# Patient Record
Sex: FEMALE | Race: WHITE | Employment: OTHER | URBAN - NONMETROPOLITAN AREA
[De-identification: names, ages, dates, MRNs, and addresses within clinical notes are randomized per-mention and may not be internally consistent; named-entity substitution may affect disease eponyms.]

---

## 2017-04-07 ENCOUNTER — HOSPITAL ENCOUNTER (OUTPATIENT)
Age: 77
Discharge: HOME | End: 2017-04-07
Payer: MEDICARE

## 2017-04-07 DIAGNOSIS — E03.9 HYPOTHYROIDISM, UNSPECIFIED TYPE: ICD-10-CM

## 2017-04-07 DIAGNOSIS — E78.5 HYPERLIPIDEMIA, UNSPECIFIED HYPERLIPIDEMIA TYPE: ICD-10-CM

## 2017-04-07 DIAGNOSIS — I10 ESSENTIAL HYPERTENSION: ICD-10-CM

## 2017-04-07 LAB
ABSOLUTE EOS #: 0.28 K/UL (ref 0–0.4)
ABSOLUTE LYMPH #: 2.1 K/UL (ref 1–4.8)
ABSOLUTE MONO #: 0.7 K/UL (ref 0–1)
ALBUMIN SERPL-MCNC: 4.2 G/DL (ref 3.5–5.2)
ALBUMIN/GLOBULIN RATIO: 1.4 (ref 1–2.5)
ALP BLD-CCNC: 67 U/L (ref 35–104)
ALT SERPL-CCNC: 13 U/L (ref 5–33)
ANION GAP SERPL CALCULATED.3IONS-SCNC: 11 MMOL/L (ref 9–17)
AST SERPL-CCNC: 20 U/L
BASOPHILS # BLD: 1 % (ref 0–2)
BASOPHILS ABSOLUTE: 0.07 K/UL (ref 0–0.2)
BILIRUB SERPL-MCNC: 0.49 MG/DL (ref 0.3–1.2)
BUN BLDV-MCNC: 23 MG/DL (ref 8–23)
BUN/CREAT BLD: 31 (ref 9–20)
CALCIUM SERPL-MCNC: 9.4 MG/DL (ref 8.6–10.4)
CHLORIDE BLD-SCNC: 100 MMOL/L (ref 98–107)
CHOLESTEROL/HDL RATIO: 1.8
CHOLESTEROL: 180 MG/DL
CO2: 31 MMOL/L (ref 20–31)
CREAT SERPL-MCNC: 0.75 MG/DL (ref 0.5–0.9)
DIFFERENTIAL TYPE: ABNORMAL
EOSINOPHILS RELATIVE PERCENT: 4 % (ref 0–8)
GFR AFRICAN AMERICAN: >60 ML/MIN
GFR NON-AFRICAN AMERICAN: >60 ML/MIN
GFR SERPL CREATININE-BSD FRML MDRD: ABNORMAL ML/MIN/{1.73_M2}
GFR SERPL CREATININE-BSD FRML MDRD: ABNORMAL ML/MIN/{1.73_M2}
GLUCOSE BLD-MCNC: 101 MG/DL (ref 70–99)
HCT VFR BLD CALC: 38.7 % (ref 36–46)
HDLC SERPL-MCNC: 100 MG/DL
HEMOGLOBIN: 12.4 G/DL (ref 12–16)
LDL CHOLESTEROL: 69 MG/DL (ref 0–130)
LYMPHOCYTES # BLD: 30 % (ref 24–44)
MCH RBC QN AUTO: 31.7 PG (ref 26–34)
MCHC RBC AUTO-ENTMCNC: 32.2 G/DL (ref 31–37)
MCV RBC AUTO: 98.3 FL (ref 80–100)
MONOCYTES # BLD: 10 % (ref 0–12)
MORPHOLOGY: ABNORMAL
PDW BLD-RTO: 14 % (ref 12.1–15.2)
PLATELET # BLD: 504 K/UL (ref 140–450)
PLATELET ESTIMATE: ABNORMAL
PMV BLD AUTO: ABNORMAL FL (ref 6–12)
POTASSIUM SERPL-SCNC: 4.3 MMOL/L (ref 3.7–5.3)
RBC # BLD: 3.93 M/UL (ref 4–5.2)
RBC # BLD: ABNORMAL 10*6/UL
SEG NEUTROPHILS: 55 % (ref 36–66)
SEGMENTED NEUTROPHILS ABSOLUTE COUNT: 3.85 K/UL (ref 1.8–7.7)
SODIUM BLD-SCNC: 142 MMOL/L (ref 135–144)
TOTAL PROTEIN: 7.2 G/DL (ref 6.4–8.3)
TRIGL SERPL-MCNC: 54 MG/DL
TSH SERPL DL<=0.05 MIU/L-ACNC: 2.46 MIU/L (ref 0.3–5)
VLDLC SERPL CALC-MCNC: NORMAL MG/DL (ref 1–30)
WBC # BLD: 7 K/UL (ref 3.5–11)
WBC # BLD: ABNORMAL 10*3/UL

## 2017-04-07 PROCEDURE — 80053 COMPREHEN METABOLIC PANEL: CPT

## 2017-04-07 PROCEDURE — 36415 COLL VENOUS BLD VENIPUNCTURE: CPT

## 2017-04-07 PROCEDURE — 80061 LIPID PANEL: CPT

## 2017-04-07 PROCEDURE — 85025 COMPLETE CBC W/AUTO DIFF WBC: CPT

## 2017-04-07 PROCEDURE — 84443 ASSAY THYROID STIM HORMONE: CPT

## 2017-08-14 ENCOUNTER — HOSPITAL ENCOUNTER (OUTPATIENT)
Dept: VASCULAR LAB | Age: 77
Discharge: HOME | End: 2017-08-14
Payer: MEDICARE

## 2017-08-14 DIAGNOSIS — R09.89 BRUIT OF LEFT CAROTID ARTERY: ICD-10-CM

## 2017-08-14 PROCEDURE — 93880 EXTRACRANIAL BILAT STUDY: CPT

## 2017-08-29 ENCOUNTER — HOSPITAL ENCOUNTER (OUTPATIENT)
Dept: LAB | Age: 77
Discharge: HOME | End: 2017-08-29
Payer: MEDICARE

## 2017-08-29 ENCOUNTER — HOSPITAL ENCOUNTER (OUTPATIENT)
Dept: NON INVASIVE DIAGNOSTICS | Age: 77
Discharge: HOME | End: 2017-08-29
Payer: MEDICARE

## 2017-08-29 LAB
ANION GAP SERPL CALCULATED.3IONS-SCNC: 13 MMOL/L (ref 9–17)
BUN BLDV-MCNC: 31 MG/DL (ref 8–23)
BUN/CREAT BLD: 34 (ref 9–20)
CALCIUM SERPL-MCNC: 9.8 MG/DL (ref 8.6–10.4)
CHLORIDE BLD-SCNC: 99 MMOL/L (ref 98–107)
CO2: 29 MMOL/L (ref 20–31)
CREAT SERPL-MCNC: 0.9 MG/DL (ref 0.5–0.9)
GFR AFRICAN AMERICAN: >60 ML/MIN
GFR NON-AFRICAN AMERICAN: >60 ML/MIN
GFR SERPL CREATININE-BSD FRML MDRD: ABNORMAL ML/MIN/{1.73_M2}
GFR SERPL CREATININE-BSD FRML MDRD: ABNORMAL ML/MIN/{1.73_M2}
GLUCOSE BLD-MCNC: 95 MG/DL (ref 70–99)
HCT VFR BLD CALC: 34.2 % (ref 36–46)
HEMOGLOBIN: 11.1 G/DL (ref 12–16)
LV EF: 60 %
LVEF MODALITY: NORMAL
MCH RBC QN AUTO: 31.5 PG (ref 26–34)
MCHC RBC AUTO-ENTMCNC: 32.5 G/DL (ref 31–37)
MCV RBC AUTO: 97.2 FL (ref 80–100)
PDW BLD-RTO: 14.9 % (ref 12.1–15.2)
PLATELET # BLD: 601 K/UL (ref 140–450)
PMV BLD AUTO: 9 FL (ref 6–12)
POTASSIUM SERPL-SCNC: 4.6 MMOL/L (ref 3.7–5.3)
RBC # BLD: 3.52 M/UL (ref 4–5.2)
SODIUM BLD-SCNC: 141 MMOL/L (ref 135–144)
WBC # BLD: 8.4 K/UL (ref 3.5–11)

## 2017-08-29 PROCEDURE — 93005 ELECTROCARDIOGRAM TRACING: CPT

## 2017-08-29 PROCEDURE — 80048 BASIC METABOLIC PNL TOTAL CA: CPT

## 2017-08-29 PROCEDURE — 36415 COLL VENOUS BLD VENIPUNCTURE: CPT

## 2017-08-29 PROCEDURE — 93306 TTE W/DOPPLER COMPLETE: CPT

## 2017-08-29 PROCEDURE — 85027 COMPLETE CBC AUTOMATED: CPT

## 2017-08-31 LAB
EKG ATRIAL RATE: 89 BPM
EKG P AXIS: 68 DEGREES
EKG P-R INTERVAL: 234 MS
EKG Q-T INTERVAL: 378 MS
EKG QRS DURATION: 88 MS
EKG QTC CALCULATION (BAZETT): 459 MS
EKG R AXIS: 31 DEGREES
EKG T AXIS: 70 DEGREES
EKG VENTRICULAR RATE: 89 BPM

## 2017-09-08 PROBLEM — I34.0 NON-RHEUMATIC MITRAL REGURGITATION: Status: ACTIVE | Noted: 2017-09-08

## 2017-09-08 PROBLEM — I27.20 PULMONARY HYPERTENSION (HCC): Status: ACTIVE | Noted: 2017-09-08

## 2017-09-08 PROBLEM — I65.22 STENOSIS OF LEFT CAROTID ARTERY: Status: ACTIVE | Noted: 2017-09-08

## 2017-09-08 PROBLEM — I36.1 NON-RHEUMATIC TRICUSPID VALVE INSUFFICIENCY: Status: ACTIVE | Noted: 2017-09-08

## 2017-09-22 ENCOUNTER — ANESTHESIA EVENT (OUTPATIENT)
Dept: OPERATING ROOM | Age: 77
DRG: 039 | End: 2017-09-22
Payer: MEDICARE

## 2017-09-22 RX ORDER — MULTIVIT-MIN/IRON/FOLIC ACID/K 18-600-40
2000 CAPSULE ORAL DAILY
COMMUNITY

## 2017-09-25 ENCOUNTER — ANESTHESIA (OUTPATIENT)
Dept: OPERATING ROOM | Age: 77
DRG: 039 | End: 2017-09-25
Payer: MEDICARE

## 2017-09-25 ENCOUNTER — HOSPITAL ENCOUNTER (INPATIENT)
Age: 77
LOS: 1 days | Discharge: HOME HEALTH | DRG: 039 | End: 2017-09-26
Attending: SURGERY | Admitting: SURGERY
Payer: MEDICARE

## 2017-09-25 VITALS — OXYGEN SATURATION: 99 % | DIASTOLIC BLOOD PRESSURE: 45 MMHG | TEMPERATURE: 96.8 F | SYSTOLIC BLOOD PRESSURE: 127 MMHG

## 2017-09-25 LAB — GLUCOSE BLD-MCNC: 98 MG/DL (ref 65–105)

## 2017-09-25 PROCEDURE — 3600000002 HC SURGERY LEVEL 2 BASE: Performed by: SURGERY

## 2017-09-25 PROCEDURE — 2580000003 HC RX 258: Performed by: SURGERY

## 2017-09-25 PROCEDURE — 88304 TISSUE EXAM BY PATHOLOGIST: CPT

## 2017-09-25 PROCEDURE — 6360000002 HC RX W HCPCS: Performed by: SURGERY

## 2017-09-25 PROCEDURE — 82947 ASSAY GLUCOSE BLOOD QUANT: CPT

## 2017-09-25 PROCEDURE — 2000000000 HC ICU R&B

## 2017-09-25 PROCEDURE — 6360000002 HC RX W HCPCS: Performed by: SPECIALIST

## 2017-09-25 PROCEDURE — 6370000000 HC RX 637 (ALT 250 FOR IP): Performed by: STUDENT IN AN ORGANIZED HEALTH CARE EDUCATION/TRAINING PROGRAM

## 2017-09-25 PROCEDURE — 2580000003 HC RX 258: Performed by: STUDENT IN AN ORGANIZED HEALTH CARE EDUCATION/TRAINING PROGRAM

## 2017-09-25 PROCEDURE — 2500000003 HC RX 250 WO HCPCS: Performed by: SURGERY

## 2017-09-25 PROCEDURE — 3700000001 HC ADD 15 MINUTES (ANESTHESIA): Performed by: SURGERY

## 2017-09-25 PROCEDURE — 2720000010 HC SURG SUPPLY STERILE: Performed by: SURGERY

## 2017-09-25 PROCEDURE — 2500000003 HC RX 250 WO HCPCS: Performed by: ANESTHESIOLOGY

## 2017-09-25 PROCEDURE — C1768 GRAFT, VASCULAR: HCPCS | Performed by: SURGERY

## 2017-09-25 PROCEDURE — 6360000002 HC RX W HCPCS: Performed by: STUDENT IN AN ORGANIZED HEALTH CARE EDUCATION/TRAINING PROGRAM

## 2017-09-25 PROCEDURE — 2500000003 HC RX 250 WO HCPCS: Performed by: SPECIALIST

## 2017-09-25 PROCEDURE — 2580000003 HC RX 258: Performed by: ANESTHESIOLOGY

## 2017-09-25 PROCEDURE — 2580000003 HC RX 258: Performed by: SPECIALIST

## 2017-09-25 PROCEDURE — 03CL0ZZ EXTIRPATION OF MATTER FROM LEFT INTERNAL CAROTID ARTERY, OPEN APPROACH: ICD-10-PCS | Performed by: SURGERY

## 2017-09-25 PROCEDURE — 3700000000 HC ANESTHESIA ATTENDED CARE: Performed by: SURGERY

## 2017-09-25 PROCEDURE — 3600000012 HC SURGERY LEVEL 2 ADDTL 15MIN: Performed by: SURGERY

## 2017-09-25 DEVICE — XENOSURE BIOLOGIC PATCH, 0.8CM X 8CM, EIFU
Type: IMPLANTABLE DEVICE | Site: CAROTID | Status: FUNCTIONAL
Brand: XENOSURE BIOLOGIC PATCH

## 2017-09-25 RX ORDER — ASCORBIC ACID 500 MG
500 TABLET ORAL DAILY
Status: DISCONTINUED | OUTPATIENT
Start: 2017-09-25 | End: 2017-09-26 | Stop reason: HOSPADM

## 2017-09-25 RX ORDER — MORPHINE SULFATE 2 MG/ML
2 INJECTION, SOLUTION INTRAMUSCULAR; INTRAVENOUS
Status: DISCONTINUED | OUTPATIENT
Start: 2017-09-25 | End: 2017-09-26

## 2017-09-25 RX ORDER — HEPARIN SODIUM 1000 [USP'U]/ML
INJECTION, SOLUTION INTRAVENOUS; SUBCUTANEOUS PRN
Status: DISCONTINUED | OUTPATIENT
Start: 2017-09-25 | End: 2017-09-25 | Stop reason: SDUPTHER

## 2017-09-25 RX ORDER — POTASSIUM CHLORIDE 20MEQ/15ML
40 LIQUID (ML) ORAL PRN
Status: DISCONTINUED | OUTPATIENT
Start: 2017-09-25 | End: 2017-09-26 | Stop reason: HOSPADM

## 2017-09-25 RX ORDER — HYDRALAZINE HYDROCHLORIDE 20 MG/ML
5 INJECTION INTRAMUSCULAR; INTRAVENOUS EVERY 10 MIN PRN
Status: DISCONTINUED | OUTPATIENT
Start: 2017-09-25 | End: 2017-09-26 | Stop reason: HOSPADM

## 2017-09-25 RX ORDER — ASPIRIN 81 MG/1
81 TABLET, CHEWABLE ORAL DAILY
Status: DISCONTINUED | OUTPATIENT
Start: 2017-09-26 | End: 2017-09-26 | Stop reason: HOSPADM

## 2017-09-25 RX ORDER — SIMVASTATIN 40 MG
40 TABLET ORAL NIGHTLY
Status: DISCONTINUED | OUTPATIENT
Start: 2017-09-25 | End: 2017-09-26 | Stop reason: HOSPADM

## 2017-09-25 RX ORDER — SODIUM CHLORIDE 9 MG/ML
INJECTION, SOLUTION INTRAVENOUS CONTINUOUS PRN
Status: DISCONTINUED | OUTPATIENT
Start: 2017-09-25 | End: 2017-09-25 | Stop reason: HOSPADM

## 2017-09-25 RX ORDER — LIDOCAINE HYDROCHLORIDE 10 MG/ML
1 INJECTION, SOLUTION EPIDURAL; INFILTRATION; INTRACAUDAL; PERINEURAL
Status: DISCONTINUED | OUTPATIENT
Start: 2017-09-25 | End: 2017-09-25 | Stop reason: HOSPADM

## 2017-09-25 RX ORDER — SODIUM CHLORIDE 0.9 % (FLUSH) 0.9 %
10 SYRINGE (ML) INJECTION EVERY 12 HOURS SCHEDULED
Status: DISCONTINUED | OUTPATIENT
Start: 2017-09-25 | End: 2017-09-26 | Stop reason: HOSPADM

## 2017-09-25 RX ORDER — GLYCOPYRROLATE 0.2 MG/ML
INJECTION INTRAMUSCULAR; INTRAVENOUS PRN
Status: DISCONTINUED | OUTPATIENT
Start: 2017-09-25 | End: 2017-09-25 | Stop reason: SDUPTHER

## 2017-09-25 RX ORDER — ONDANSETRON 2 MG/ML
4 INJECTION INTRAMUSCULAR; INTRAVENOUS EVERY 6 HOURS PRN
Status: DISCONTINUED | OUTPATIENT
Start: 2017-09-25 | End: 2017-09-26 | Stop reason: HOSPADM

## 2017-09-25 RX ORDER — M-VIT,TX,IRON,MINS/CALC/FOLIC 27MG-0.4MG
1 TABLET ORAL DAILY
Status: DISCONTINUED | OUTPATIENT
Start: 2017-09-25 | End: 2017-09-26 | Stop reason: HOSPADM

## 2017-09-25 RX ORDER — OXYCODONE HYDROCHLORIDE AND ACETAMINOPHEN 5; 325 MG/1; MG/1
1 TABLET ORAL EVERY 4 HOURS PRN
Status: DISCONTINUED | OUTPATIENT
Start: 2017-09-25 | End: 2017-09-26

## 2017-09-25 RX ORDER — LEVOTHYROXINE SODIUM 0.07 MG/1
75 TABLET ORAL DAILY
Status: DISCONTINUED | OUTPATIENT
Start: 2017-09-26 | End: 2017-09-26 | Stop reason: HOSPADM

## 2017-09-25 RX ORDER — IBUPROFEN 200 MG
1250 CAPSULE ORAL DAILY
Status: DISCONTINUED | OUTPATIENT
Start: 2017-09-25 | End: 2017-09-26 | Stop reason: HOSPADM

## 2017-09-25 RX ORDER — TRAMADOL HYDROCHLORIDE 50 MG/1
50 TABLET ORAL EVERY 6 HOURS PRN
Status: DISCONTINUED | OUTPATIENT
Start: 2017-09-25 | End: 2017-09-26 | Stop reason: HOSPADM

## 2017-09-25 RX ORDER — POTASSIUM CHLORIDE 7.45 MG/ML
10 INJECTION INTRAVENOUS PRN
Status: DISCONTINUED | OUTPATIENT
Start: 2017-09-25 | End: 2017-09-26 | Stop reason: HOSPADM

## 2017-09-25 RX ORDER — LABETALOL HYDROCHLORIDE 5 MG/ML
5 INJECTION, SOLUTION INTRAVENOUS EVERY 10 MIN PRN
Status: DISCONTINUED | OUTPATIENT
Start: 2017-09-25 | End: 2017-09-26 | Stop reason: HOSPADM

## 2017-09-25 RX ORDER — SODIUM CHLORIDE, SODIUM LACTATE, POTASSIUM CHLORIDE, CALCIUM CHLORIDE 600; 310; 30; 20 MG/100ML; MG/100ML; MG/100ML; MG/100ML
INJECTION, SOLUTION INTRAVENOUS CONTINUOUS
Status: DISCONTINUED | OUTPATIENT
Start: 2017-09-25 | End: 2017-09-25

## 2017-09-25 RX ORDER — FENTANYL CITRATE 50 UG/ML
INJECTION, SOLUTION INTRAMUSCULAR; INTRAVENOUS PRN
Status: DISCONTINUED | OUTPATIENT
Start: 2017-09-25 | End: 2017-09-25 | Stop reason: SDUPTHER

## 2017-09-25 RX ORDER — MORPHINE SULFATE 4 MG/ML
4 INJECTION, SOLUTION INTRAMUSCULAR; INTRAVENOUS
Status: DISCONTINUED | OUTPATIENT
Start: 2017-09-25 | End: 2017-09-26

## 2017-09-25 RX ORDER — SODIUM CHLORIDE 0.9 % (FLUSH) 0.9 %
10 SYRINGE (ML) INJECTION PRN
Status: DISCONTINUED | OUTPATIENT
Start: 2017-09-25 | End: 2017-09-26 | Stop reason: HOSPADM

## 2017-09-25 RX ORDER — PROTAMINE SULFATE 10 MG/ML
INJECTION, SOLUTION INTRAVENOUS PRN
Status: DISCONTINUED | OUTPATIENT
Start: 2017-09-25 | End: 2017-09-25 | Stop reason: SDUPTHER

## 2017-09-25 RX ORDER — MAGNESIUM HYDROXIDE 1200 MG/15ML
LIQUID ORAL CONTINUOUS PRN
Status: DISCONTINUED | OUTPATIENT
Start: 2017-09-25 | End: 2017-09-25 | Stop reason: HOSPADM

## 2017-09-25 RX ORDER — DIPHENHYDRAMINE HYDROCHLORIDE 50 MG/ML
12.5 INJECTION INTRAMUSCULAR; INTRAVENOUS
Status: ACTIVE | OUTPATIENT
Start: 2017-09-25 | End: 2017-09-25

## 2017-09-25 RX ORDER — EPHEDRINE SULFATE 50 MG/ML
INJECTION, SOLUTION INTRAVENOUS PRN
Status: DISCONTINUED | OUTPATIENT
Start: 2017-09-25 | End: 2017-09-25 | Stop reason: SDUPTHER

## 2017-09-25 RX ORDER — HEPARIN SODIUM 1000 [USP'U]/ML
INJECTION, SOLUTION INTRAVENOUS; SUBCUTANEOUS PRN
Status: DISCONTINUED | OUTPATIENT
Start: 2017-09-25 | End: 2017-09-25 | Stop reason: HOSPADM

## 2017-09-25 RX ORDER — ROCURONIUM BROMIDE 10 MG/ML
INJECTION, SOLUTION INTRAVENOUS PRN
Status: DISCONTINUED | OUTPATIENT
Start: 2017-09-25 | End: 2017-09-25 | Stop reason: SDUPTHER

## 2017-09-25 RX ORDER — HYDROCHLOROTHIAZIDE 25 MG/1
12.5 TABLET ORAL DAILY
Status: DISCONTINUED | OUTPATIENT
Start: 2017-09-25 | End: 2017-09-26 | Stop reason: HOSPADM

## 2017-09-25 RX ORDER — PROPOFOL 10 MG/ML
INJECTION, EMULSION INTRAVENOUS PRN
Status: DISCONTINUED | OUTPATIENT
Start: 2017-09-25 | End: 2017-09-25 | Stop reason: SDUPTHER

## 2017-09-25 RX ORDER — LIDOCAINE HYDROCHLORIDE 10 MG/ML
INJECTION, SOLUTION INFILTRATION; PERINEURAL PRN
Status: DISCONTINUED | OUTPATIENT
Start: 2017-09-25 | End: 2017-09-25 | Stop reason: SDUPTHER

## 2017-09-25 RX ORDER — LOSARTAN POTASSIUM 50 MG/1
100 TABLET ORAL DAILY
Status: DISCONTINUED | OUTPATIENT
Start: 2017-09-25 | End: 2017-09-26 | Stop reason: HOSPADM

## 2017-09-25 RX ORDER — FENTANYL CITRATE 50 UG/ML
25 INJECTION, SOLUTION INTRAMUSCULAR; INTRAVENOUS EVERY 5 MIN PRN
Status: DISCONTINUED | OUTPATIENT
Start: 2017-09-25 | End: 2017-09-26

## 2017-09-25 RX ORDER — MAGNESIUM SULFATE 1 G/100ML
1 INJECTION INTRAVENOUS PRN
Status: DISCONTINUED | OUTPATIENT
Start: 2017-09-25 | End: 2017-09-26 | Stop reason: HOSPADM

## 2017-09-25 RX ORDER — LIDOCAINE HYDROCHLORIDE 10 MG/ML
INJECTION, SOLUTION EPIDURAL; INFILTRATION; INTRACAUDAL; PERINEURAL PRN
Status: DISCONTINUED | OUTPATIENT
Start: 2017-09-25 | End: 2017-09-25 | Stop reason: HOSPADM

## 2017-09-25 RX ORDER — CYANOCOBALAMIN (VITAMIN B-12) 1000 MCG
1 TABLET, EXTENDED RELEASE ORAL DAILY
Status: DISCONTINUED | OUTPATIENT
Start: 2017-09-25 | End: 2017-09-25 | Stop reason: CLARIF

## 2017-09-25 RX ORDER — LOSARTAN POTASSIUM AND HYDROCHLOROTHIAZIDE 12.5; 1 MG/1; MG/1
1 TABLET ORAL DAILY
Status: DISCONTINUED | OUTPATIENT
Start: 2017-09-25 | End: 2017-09-25 | Stop reason: CLARIF

## 2017-09-25 RX ORDER — NEOSTIGMINE METHYLSULFATE 1 MG/ML
INJECTION, SOLUTION INTRAVENOUS PRN
Status: DISCONTINUED | OUTPATIENT
Start: 2017-09-25 | End: 2017-09-25 | Stop reason: SDUPTHER

## 2017-09-25 RX ORDER — ONDANSETRON 2 MG/ML
4 INJECTION INTRAMUSCULAR; INTRAVENOUS
Status: ACTIVE | OUTPATIENT
Start: 2017-09-25 | End: 2017-09-25

## 2017-09-25 RX ORDER — POTASSIUM CHLORIDE 20 MEQ/1
40 TABLET, EXTENDED RELEASE ORAL PRN
Status: DISCONTINUED | OUTPATIENT
Start: 2017-09-25 | End: 2017-09-26 | Stop reason: HOSPADM

## 2017-09-25 RX ADMIN — MORPHINE SULFATE 4 MG: 4 INJECTION, SOLUTION INTRAMUSCULAR; INTRAVENOUS at 10:37

## 2017-09-25 RX ADMIN — PHENYLEPHRINE HYDROCHLORIDE 100 MCG: 10 INJECTION INTRAVENOUS at 07:56

## 2017-09-25 RX ADMIN — PHENYLEPHRINE HYDROCHLORIDE 100 MCG: 10 INJECTION INTRAVENOUS at 08:15

## 2017-09-25 RX ADMIN — GLYCOPYRROLATE 0.4 MG: 0.2 INJECTION INTRAMUSCULAR; INTRAVENOUS at 09:55

## 2017-09-25 RX ADMIN — PROPOFOL 100 MG: 10 INJECTION, EMULSION INTRAVENOUS at 07:39

## 2017-09-25 RX ADMIN — PROPOFOL 50 MG: 10 INJECTION, EMULSION INTRAVENOUS at 09:50

## 2017-09-25 RX ADMIN — PHENYLEPHRINE HYDROCHLORIDE 100 MCG: 10 INJECTION INTRAVENOUS at 07:49

## 2017-09-25 RX ADMIN — PROTAMINE SULFATE 30 MG: 10 INJECTION, SOLUTION INTRAVENOUS at 09:27

## 2017-09-25 RX ADMIN — PHENYLEPHRINE HYDROCHLORIDE 100 MCG: 10 INJECTION INTRAVENOUS at 08:06

## 2017-09-25 RX ADMIN — PHENYLEPHRINE HYDROCHLORIDE 100 MCG: 10 INJECTION INTRAVENOUS at 08:40

## 2017-09-25 RX ADMIN — PHENYLEPHRINE HYDROCHLORIDE 25 MCG/MIN: 10 INJECTION INTRAMUSCULAR; INTRAVENOUS; SUBCUTANEOUS at 08:59

## 2017-09-25 RX ADMIN — NEOSTIGMINE METHYLSULFATE 2 MG: 1 INJECTION INTRAVENOUS at 09:55

## 2017-09-25 RX ADMIN — SIMVASTATIN 40 MG: 40 TABLET, FILM COATED ORAL at 20:51

## 2017-09-25 RX ADMIN — HEPARIN SODIUM 5000 UNITS: 1000 INJECTION INTRAVENOUS; SUBCUTANEOUS at 08:40

## 2017-09-25 RX ADMIN — ROCURONIUM BROMIDE 40 MG: 10 INJECTION INTRAVENOUS at 07:39

## 2017-09-25 RX ADMIN — NICARDIPINE HYDROCHLORIDE 2.5 MG/HR: 0.1 INJECTION, SOLUTION INTRAVENOUS at 09:55

## 2017-09-25 RX ADMIN — PHENYLEPHRINE HYDROCHLORIDE 100 MCG: 10 INJECTION INTRAVENOUS at 08:20

## 2017-09-25 RX ADMIN — FENTANYL CITRATE 50 MCG: 50 INJECTION INTRAMUSCULAR; INTRAVENOUS at 07:36

## 2017-09-25 RX ADMIN — SODIUM CHLORIDE, POTASSIUM CHLORIDE, SODIUM LACTATE AND CALCIUM CHLORIDE: 600; 310; 30; 20 INJECTION, SOLUTION INTRAVENOUS at 06:47

## 2017-09-25 RX ADMIN — EPHEDRINE SULFATE 10 MG: 50 INJECTION, SOLUTION INTRAMUSCULAR; INTRAVENOUS; SUBCUTANEOUS at 09:20

## 2017-09-25 RX ADMIN — EPHEDRINE SULFATE 5 MG: 50 INJECTION, SOLUTION INTRAMUSCULAR; INTRAVENOUS; SUBCUTANEOUS at 09:14

## 2017-09-25 RX ADMIN — LIDOCAINE HYDROCHLORIDE 50 MG: 10 INJECTION, SOLUTION INFILTRATION; PERINEURAL at 07:39

## 2017-09-25 RX ADMIN — PHENYLEPHRINE HYDROCHLORIDE 100 MCG: 10 INJECTION INTRAVENOUS at 08:00

## 2017-09-25 RX ADMIN — PHENYLEPHRINE HYDROCHLORIDE 100 MCG: 10 INJECTION INTRAVENOUS at 09:12

## 2017-09-25 RX ADMIN — FENTANYL CITRATE 50 MCG: 50 INJECTION INTRAMUSCULAR; INTRAVENOUS at 08:25

## 2017-09-25 RX ADMIN — PHENYLEPHRINE HYDROCHLORIDE 100 MCG: 10 INJECTION INTRAVENOUS at 08:19

## 2017-09-25 RX ADMIN — DEXTROSE MONOHYDRATE 2 G: 50 INJECTION, SOLUTION INTRAVENOUS at 17:13

## 2017-09-25 RX ADMIN — Medication 2 G: at 08:12

## 2017-09-25 ASSESSMENT — PAIN DESCRIPTION - ORIENTATION: ORIENTATION: LEFT

## 2017-09-25 ASSESSMENT — PAIN SCALES - GENERAL
PAINLEVEL_OUTOF10: 5
PAINLEVEL_OUTOF10: 5
PAINLEVEL_OUTOF10: 0

## 2017-09-25 ASSESSMENT — PAIN DESCRIPTION - LOCATION: LOCATION: NECK

## 2017-09-25 ASSESSMENT — PAIN - FUNCTIONAL ASSESSMENT: PAIN_FUNCTIONAL_ASSESSMENT: 0-10

## 2017-09-25 ASSESSMENT — PAIN DESCRIPTION - PAIN TYPE: TYPE: SURGICAL PAIN

## 2017-09-26 VITALS
WEIGHT: 96 LBS | HEIGHT: 62 IN | SYSTOLIC BLOOD PRESSURE: 144 MMHG | BODY MASS INDEX: 17.66 KG/M2 | OXYGEN SATURATION: 100 % | HEART RATE: 95 BPM | TEMPERATURE: 98.6 F | RESPIRATION RATE: 16 BRPM | DIASTOLIC BLOOD PRESSURE: 81 MMHG

## 2017-09-26 LAB
ANION GAP SERPL CALCULATED.3IONS-SCNC: 12 MMOL/L (ref 9–17)
BUN BLDV-MCNC: 13 MG/DL (ref 8–23)
BUN/CREAT BLD: ABNORMAL (ref 9–20)
CALCIUM SERPL-MCNC: 8.5 MG/DL (ref 8.6–10.4)
CHLORIDE BLD-SCNC: 105 MMOL/L (ref 98–107)
CO2: 26 MMOL/L (ref 20–31)
CREAT SERPL-MCNC: 0.67 MG/DL (ref 0.5–0.9)
GFR AFRICAN AMERICAN: >60 ML/MIN
GFR NON-AFRICAN AMERICAN: >60 ML/MIN
GFR SERPL CREATININE-BSD FRML MDRD: ABNORMAL ML/MIN/{1.73_M2}
GFR SERPL CREATININE-BSD FRML MDRD: ABNORMAL ML/MIN/{1.73_M2}
GLUCOSE BLD-MCNC: 98 MG/DL (ref 70–99)
HCT VFR BLD CALC: 32.2 % (ref 36–46)
HEMOGLOBIN: 10.4 G/DL (ref 12–16)
MCH RBC QN AUTO: 31.6 PG (ref 26–34)
MCHC RBC AUTO-ENTMCNC: 32.4 G/DL (ref 31–37)
MCV RBC AUTO: 97.5 FL (ref 80–100)
PDW BLD-RTO: 16.1 % (ref 12.5–15.4)
PLATELET # BLD: 512 K/UL (ref 140–450)
PMV BLD AUTO: 8.9 FL (ref 6–12)
POTASSIUM SERPL-SCNC: 3.9 MMOL/L (ref 3.7–5.3)
RBC # BLD: 3.3 M/UL (ref 4–5.2)
SODIUM BLD-SCNC: 143 MMOL/L (ref 135–144)
WBC # BLD: 9.5 K/UL (ref 3.5–11)

## 2017-09-26 PROCEDURE — 2580000003 HC RX 258: Performed by: STUDENT IN AN ORGANIZED HEALTH CARE EDUCATION/TRAINING PROGRAM

## 2017-09-26 PROCEDURE — 6370000000 HC RX 637 (ALT 250 FOR IP): Performed by: STUDENT IN AN ORGANIZED HEALTH CARE EDUCATION/TRAINING PROGRAM

## 2017-09-26 PROCEDURE — 80048 BASIC METABOLIC PNL TOTAL CA: CPT

## 2017-09-26 PROCEDURE — 36415 COLL VENOUS BLD VENIPUNCTURE: CPT

## 2017-09-26 PROCEDURE — 6360000002 HC RX W HCPCS: Performed by: STUDENT IN AN ORGANIZED HEALTH CARE EDUCATION/TRAINING PROGRAM

## 2017-09-26 PROCEDURE — 85027 COMPLETE CBC AUTOMATED: CPT

## 2017-09-26 RX ORDER — TRAMADOL HYDROCHLORIDE 50 MG/1
50 TABLET ORAL EVERY 6 HOURS PRN
Qty: 20 TABLET | Refills: 0 | Status: SHIPPED | OUTPATIENT
Start: 2017-09-26 | End: 2017-10-02 | Stop reason: CLARIF

## 2017-09-26 RX ADMIN — HYDROCHLOROTHIAZIDE 12.5 MG: 25 TABLET ORAL at 11:04

## 2017-09-26 RX ADMIN — SODIUM CHLORIDE, PRESERVATIVE FREE 10 ML: 5 INJECTION INTRAVENOUS at 11:05

## 2017-09-26 RX ADMIN — VITAMIN D, TAB 1000IU (100/BT) 2000 UNITS: 25 TAB at 11:03

## 2017-09-26 RX ADMIN — OXYCODONE HYDROCHLORIDE AND ACETAMINOPHEN 500 MG: 500 TABLET ORAL at 11:06

## 2017-09-26 RX ADMIN — CALCIUM CARBONATE 1250 MG: 1250 TABLET ORAL at 11:10

## 2017-09-26 RX ADMIN — TRAMADOL HYDROCHLORIDE 50 MG: 50 TABLET, FILM COATED ORAL at 11:21

## 2017-09-26 RX ADMIN — ASPIRIN 81 MG 81 MG: 81 TABLET ORAL at 11:05

## 2017-09-26 RX ADMIN — MULTIPLE VITAMINS W/ MINERALS TAB 1 TABLET: TAB at 11:04

## 2017-09-26 RX ADMIN — DEXTROSE MONOHYDRATE 2 G: 50 INJECTION, SOLUTION INTRAVENOUS at 11:11

## 2017-09-26 RX ADMIN — DEXTROSE MONOHYDRATE 2 G: 50 INJECTION, SOLUTION INTRAVENOUS at 00:07

## 2017-09-26 RX ADMIN — LOSARTAN POTASSIUM 100 MG: 50 TABLET, FILM COATED ORAL at 11:03

## 2017-09-26 RX ADMIN — LEVOTHYROXINE SODIUM 75 MCG: 75 TABLET ORAL at 06:12

## 2017-09-26 ASSESSMENT — PAIN SCALES - GENERAL
PAINLEVEL_OUTOF10: 0
PAINLEVEL_OUTOF10: 4

## 2017-09-27 LAB — SURGICAL PATHOLOGY REPORT: NORMAL

## 2018-04-11 ENCOUNTER — HOSPITAL ENCOUNTER (OUTPATIENT)
Dept: VASCULAR LAB | Age: 78
Discharge: HOME | End: 2018-04-13
Payer: MEDICARE

## 2018-04-11 DIAGNOSIS — I65.22 STENOSIS OF LEFT CAROTID ARTERY: ICD-10-CM

## 2018-04-11 PROCEDURE — 93880 EXTRACRANIAL BILAT STUDY: CPT

## 2018-10-17 ENCOUNTER — HOSPITAL ENCOUNTER (OUTPATIENT)
Age: 78
Discharge: HOME | End: 2018-10-17
Payer: MEDICARE

## 2018-10-17 DIAGNOSIS — I10 ESSENTIAL HYPERTENSION: ICD-10-CM

## 2018-10-17 LAB — TSH SERPL DL<=0.05 MIU/L-ACNC: 1.42 MIU/L (ref 0.3–5)

## 2018-10-17 PROCEDURE — 84443 ASSAY THYROID STIM HORMONE: CPT

## 2018-10-17 PROCEDURE — 36415 COLL VENOUS BLD VENIPUNCTURE: CPT

## 2018-10-29 ENCOUNTER — HOSPITAL ENCOUNTER (OUTPATIENT)
Dept: VASCULAR LAB | Age: 78
Discharge: HOME | End: 2018-10-31
Payer: MEDICARE

## 2018-10-29 DIAGNOSIS — I65.23 BILATERAL CAROTID ARTERY STENOSIS: ICD-10-CM

## 2018-10-29 PROCEDURE — 93880 EXTRACRANIAL BILAT STUDY: CPT

## 2019-03-26 PROBLEM — M47.817 SPONDYLOSIS OF LUMBOSACRAL REGION WITHOUT MYELOPATHY OR RADICULOPATHY: Chronic | Status: ACTIVE | Noted: 2019-03-26

## 2019-06-21 ENCOUNTER — HOSPITAL ENCOUNTER (OUTPATIENT)
Dept: WOMENS IMAGING | Age: 79
Discharge: HOME | End: 2019-06-23
Payer: MEDICARE

## 2019-06-21 DIAGNOSIS — Z12.31 ENCOUNTER FOR SCREENING MAMMOGRAM FOR BREAST CANCER: ICD-10-CM

## 2019-06-21 PROCEDURE — 77063 BREAST TOMOSYNTHESIS BI: CPT

## 2019-08-23 ENCOUNTER — HOSPITAL ENCOUNTER (OUTPATIENT)
Age: 79
Discharge: HOME | End: 2019-08-23
Payer: MEDICARE

## 2019-08-23 DIAGNOSIS — I10 ESSENTIAL HYPERTENSION: ICD-10-CM

## 2019-08-23 DIAGNOSIS — E03.9 HYPOTHYROIDISM, UNSPECIFIED TYPE: ICD-10-CM

## 2019-08-23 LAB
ABSOLUTE EOS #: 0.24 K/UL (ref 0–0.44)
ABSOLUTE IMMATURE GRANULOCYTE: <0.03 K/UL (ref 0–0.3)
ABSOLUTE LYMPH #: 1.58 K/UL (ref 1.1–3.7)
ABSOLUTE MONO #: 1.05 K/UL (ref 0.1–1.2)
ALBUMIN SERPL-MCNC: 4 G/DL (ref 3.5–5.2)
ALBUMIN/GLOBULIN RATIO: 1.3 (ref 1–2.5)
ALP BLD-CCNC: 66 U/L (ref 35–104)
ALT SERPL-CCNC: 14 U/L (ref 5–33)
ANION GAP SERPL CALCULATED.3IONS-SCNC: 13 MMOL/L (ref 9–17)
AST SERPL-CCNC: 21 U/L
BASOPHILS # BLD: 1 % (ref 0–2)
BASOPHILS ABSOLUTE: 0.1 K/UL (ref 0–0.2)
BILIRUB SERPL-MCNC: 0.38 MG/DL (ref 0.3–1.2)
BUN BLDV-MCNC: 29 MG/DL (ref 8–23)
BUN/CREAT BLD: 28 (ref 9–20)
CALCIUM SERPL-MCNC: 9.4 MG/DL (ref 8.6–10.4)
CHLORIDE BLD-SCNC: 97 MMOL/L (ref 98–107)
CHOLESTEROL/HDL RATIO: 1.9
CHOLESTEROL: 176 MG/DL
CO2: 26 MMOL/L (ref 20–31)
CREAT SERPL-MCNC: 1.02 MG/DL (ref 0.5–0.9)
DIFFERENTIAL TYPE: ABNORMAL
EOSINOPHILS RELATIVE PERCENT: 3 % (ref 1–4)
GFR AFRICAN AMERICAN: >60 ML/MIN
GFR NON-AFRICAN AMERICAN: 52 ML/MIN
GFR SERPL CREATININE-BSD FRML MDRD: ABNORMAL ML/MIN/{1.73_M2}
GFR SERPL CREATININE-BSD FRML MDRD: ABNORMAL ML/MIN/{1.73_M2}
GLUCOSE BLD-MCNC: 92 MG/DL (ref 70–99)
HCT VFR BLD CALC: 35.8 % (ref 36.3–47.1)
HDLC SERPL-MCNC: 92 MG/DL
HEMOGLOBIN: 10.9 G/DL (ref 11.9–15.1)
IMMATURE GRANULOCYTES: 0 %
LDL CHOLESTEROL: 74 MG/DL (ref 0–130)
LYMPHOCYTES # BLD: 18 % (ref 24–43)
MCH RBC QN AUTO: 31.9 PG (ref 25.2–33.5)
MCHC RBC AUTO-ENTMCNC: 30.4 G/DL (ref 28.4–34.8)
MCV RBC AUTO: 104.7 FL (ref 82.6–102.9)
MONOCYTES # BLD: 12 % (ref 3–12)
NRBC AUTOMATED: 0 PER 100 WBC
PDW BLD-RTO: 13.4 % (ref 11.8–14.4)
PLATELET # BLD: 984 K/UL (ref 138–453)
PLATELET ESTIMATE: ABNORMAL
PMV BLD AUTO: 10.2 FL (ref 8.1–13.5)
POTASSIUM SERPL-SCNC: 4.6 MMOL/L (ref 3.7–5.3)
RBC # BLD: 3.42 M/UL (ref 3.95–5.11)
RBC # BLD: ABNORMAL 10*6/UL
SEG NEUTROPHILS: 66 % (ref 36–65)
SEGMENTED NEUTROPHILS ABSOLUTE COUNT: 5.6 K/UL (ref 1.5–8.1)
SODIUM BLD-SCNC: 136 MMOL/L (ref 135–144)
TOTAL PROTEIN: 7.2 G/DL (ref 6.4–8.3)
TRIGL SERPL-MCNC: 48 MG/DL
TSH SERPL DL<=0.05 MIU/L-ACNC: 1.33 MIU/L (ref 0.3–5)
VLDLC SERPL CALC-MCNC: NORMAL MG/DL (ref 1–30)
WBC # BLD: 8.6 K/UL (ref 3.5–11.3)
WBC # BLD: ABNORMAL 10*3/UL

## 2019-08-23 PROCEDURE — 85025 COMPLETE CBC W/AUTO DIFF WBC: CPT

## 2019-08-23 PROCEDURE — 84443 ASSAY THYROID STIM HORMONE: CPT

## 2019-08-23 PROCEDURE — 80061 LIPID PANEL: CPT

## 2019-08-23 PROCEDURE — 36415 COLL VENOUS BLD VENIPUNCTURE: CPT

## 2019-08-23 PROCEDURE — 80053 COMPREHEN METABOLIC PANEL: CPT

## 2019-10-23 ENCOUNTER — HOSPITAL ENCOUNTER (OUTPATIENT)
Dept: VASCULAR LAB | Age: 79
Discharge: HOME | End: 2019-10-25
Payer: MEDICARE

## 2019-10-23 DIAGNOSIS — I65.23 BILATERAL CAROTID ARTERY STENOSIS: ICD-10-CM

## 2019-10-23 PROCEDURE — 93880 EXTRACRANIAL BILAT STUDY: CPT

## 2020-10-13 ENCOUNTER — APPOINTMENT (OUTPATIENT)
Dept: GENERAL RADIOLOGY | Age: 80
End: 2020-10-13
Payer: MEDICARE

## 2020-10-13 ENCOUNTER — HOSPITAL ENCOUNTER (INPATIENT)
Age: 80
LOS: 5 days | Discharge: NURSING, REHAB OR LONG-TERM CARE | DRG: 812 | End: 2020-10-18
Attending: EMERGENCY MEDICINE | Admitting: INTERNAL MEDICINE
Payer: MEDICARE

## 2020-10-13 ENCOUNTER — APPOINTMENT (OUTPATIENT)
Dept: VASCULAR LAB | Age: 80
End: 2020-10-13
Payer: MEDICARE

## 2020-10-13 ENCOUNTER — HOSPITAL ENCOUNTER (EMERGENCY)
Age: 80
Discharge: ANOTHER HOSPITAL | End: 2020-10-13
Attending: FAMILY MEDICINE
Payer: MEDICARE

## 2020-10-13 VITALS
SYSTOLIC BLOOD PRESSURE: 158 MMHG | BODY MASS INDEX: 20.12 KG/M2 | WEIGHT: 110 LBS | OXYGEN SATURATION: 96 % | DIASTOLIC BLOOD PRESSURE: 47 MMHG | HEART RATE: 96 BPM | TEMPERATURE: 97.8 F | RESPIRATION RATE: 18 BRPM

## 2020-10-13 PROBLEM — I07.1 SEVERE TRICUSPID REGURGITATION: Status: ACTIVE | Noted: 2020-10-13

## 2020-10-13 PROBLEM — Z98.890 HISTORY OF CAROTID ENDARTERECTOMY: Status: ACTIVE | Noted: 2020-10-13

## 2020-10-13 PROBLEM — D64.9 ANEMIA: Status: ACTIVE | Noted: 2020-10-13

## 2020-10-13 PROBLEM — K59.00 CONSTIPATION: Status: ACTIVE | Noted: 2020-10-13

## 2020-10-13 PROBLEM — I82.409 DVT (DEEP VENOUS THROMBOSIS) (HCC): Status: ACTIVE | Noted: 2020-10-13

## 2020-10-13 PROBLEM — D75.839 THROMBOCYTOSIS: Status: ACTIVE | Noted: 2020-10-13

## 2020-10-13 PROBLEM — I82.401 ACUTE DEEP VEIN THROMBOSIS (DVT) OF RIGHT LOWER EXTREMITY (HCC): Status: ACTIVE | Noted: 2020-10-13

## 2020-10-13 LAB
-: ABNORMAL
ABO/RH: NORMAL
ABSOLUTE EOS #: 0 K/UL (ref 0–0.4)
ABSOLUTE EOS #: 0 K/UL (ref 0–0.44)
ABSOLUTE IMMATURE GRANULOCYTE: 0.09 K/UL (ref 0–0.3)
ABSOLUTE IMMATURE GRANULOCYTE: 0.09 K/UL (ref 0–0.3)
ABSOLUTE LYMPH #: 0.94 K/UL (ref 1.1–3.7)
ABSOLUTE LYMPH #: 1.2 K/UL (ref 1–4.8)
ABSOLUTE MONO #: 0.55 K/UL (ref 0.1–0.8)
ABSOLUTE MONO #: 0.94 K/UL (ref 0.1–1.2)
ABSOLUTE RETIC #: 0.02 M/UL (ref 0.03–0.08)
ABSOLUTE RETIC #: 0.06 M/UL (ref 0.03–0.08)
ALBUMIN SERPL-MCNC: 3.4 G/DL (ref 3.5–5.2)
ALBUMIN SERPL-MCNC: 3.7 G/DL (ref 3.5–5.2)
ALBUMIN/GLOBULIN RATIO: 1.3 (ref 1–2.5)
ALBUMIN/GLOBULIN RATIO: 1.3 (ref 1–2.5)
ALP BLD-CCNC: 69 U/L (ref 35–104)
ALP BLD-CCNC: 73 U/L (ref 35–104)
ALT SERPL-CCNC: 18 U/L (ref 5–33)
ALT SERPL-CCNC: 18 U/L (ref 5–33)
AMORPHOUS: ABNORMAL
ANION GAP SERPL CALCULATED.3IONS-SCNC: 10 MMOL/L (ref 9–17)
ANTIBODY SCREEN: NEGATIVE
ARM BAND NUMBER: NORMAL
AST SERPL-CCNC: 29 U/L
AST SERPL-CCNC: 29 U/L
BACTERIA: ABNORMAL
BASOPHILS # BLD: 1 % (ref 0–2)
BASOPHILS # BLD: 2 % (ref 0–2)
BASOPHILS ABSOLUTE: 0.09 K/UL (ref 0–0.2)
BASOPHILS ABSOLUTE: 0.18 K/UL (ref 0–0.2)
BILIRUB SERPL-MCNC: 0.22 MG/DL (ref 0.3–1.2)
BILIRUB SERPL-MCNC: 0.29 MG/DL (ref 0.3–1.2)
BILIRUBIN DIRECT: <0.08 MG/DL
BILIRUBIN URINE: NEGATIVE
BILIRUBIN, INDIRECT: ABNORMAL MG/DL (ref 0–1)
BNP INTERPRETATION: ABNORMAL
BUN BLDV-MCNC: 21 MG/DL (ref 8–23)
BUN/CREAT BLD: 22 (ref 9–20)
C-REACTIVE PROTEIN: 18.3 MG/L (ref 0–5)
CALCIUM SERPL-MCNC: 8.5 MG/DL (ref 8.6–10.4)
CASTS UA: ABNORMAL /LPF (ref 0–8)
CHLORIDE BLD-SCNC: 103 MMOL/L (ref 98–107)
CO2: 25 MMOL/L (ref 20–31)
COLOR: YELLOW
COMMENT UA: ABNORMAL
CREAT SERPL-MCNC: 0.96 MG/DL (ref 0.5–0.9)
CRYSTALS, UA: ABNORMAL /HPF
D-DIMER QUANTITATIVE: 0.3 MG/L FEU (ref 0–0.59)
DIFFERENTIAL TYPE: ABNORMAL
DIFFERENTIAL TYPE: ABNORMAL
EKG ATRIAL RATE: 92 BPM
EKG P AXIS: 62 DEGREES
EKG P-R INTERVAL: 224 MS
EKG Q-T INTERVAL: 398 MS
EKG QRS DURATION: 80 MS
EKG QTC CALCULATION (BAZETT): 492 MS
EKG R AXIS: 30 DEGREES
EKG T AXIS: 68 DEGREES
EKG VENTRICULAR RATE: 92 BPM
EOSINOPHILS RELATIVE PERCENT: 0 % (ref 1–4)
EOSINOPHILS RELATIVE PERCENT: 0 % (ref 1–4)
EPITHELIAL CELLS UA: ABNORMAL /HPF (ref 0–5)
EXPIRATION DATE: NORMAL
FERRITIN: 9 UG/L (ref 13–150)
GFR AFRICAN AMERICAN: >60 ML/MIN
GFR NON-AFRICAN AMERICAN: 56 ML/MIN
GFR SERPL CREATININE-BSD FRML MDRD: ABNORMAL ML/MIN/{1.73_M2}
GFR SERPL CREATININE-BSD FRML MDRD: ABNORMAL ML/MIN/{1.73_M2}
GLOBULIN: ABNORMAL G/DL (ref 1.5–3.8)
GLUCOSE BLD-MCNC: 117 MG/DL (ref 70–99)
GLUCOSE URINE: NEGATIVE
HAPTOGLOBIN: 138 MG/DL (ref 30–200)
HCT VFR BLD CALC: 19.7 % (ref 36.3–47.1)
HCT VFR BLD CALC: 19.9 % (ref 36.3–47.1)
HCT VFR BLD CALC: 23.8 % (ref 36.3–47.1)
HEMOGLOBIN: 5.5 G/DL (ref 11.9–15.1)
HEMOGLOBIN: 5.6 G/DL (ref 11.9–15.1)
HEMOGLOBIN: 7 G/DL (ref 11.9–15.1)
IMMATURE GRANULOCYTES: 1 %
IMMATURE GRANULOCYTES: 1 %
IMMATURE RETIC FRACT: 11.2 % (ref 2.7–18.3)
IMMATURE RETIC FRACT: 20.9 % (ref 2.7–18.3)
IRON SATURATION: 6 % (ref 20–55)
IRON: 22 UG/DL (ref 37–145)
KETONES, URINE: NEGATIVE
LACTATE DEHYDROGENASE: 293 U/L (ref 135–214)
LEUKOCYTE ESTERASE, URINE: NEGATIVE
LYMPHOCYTES # BLD: 10 % (ref 24–43)
LYMPHOCYTES # BLD: 13 % (ref 24–44)
MCH RBC QN AUTO: 21.8 PG (ref 25.2–33.5)
MCH RBC QN AUTO: 22.2 PG (ref 25.2–33.5)
MCHC RBC AUTO-ENTMCNC: 27.9 G/DL (ref 28.4–34.8)
MCHC RBC AUTO-ENTMCNC: 28.1 G/DL (ref 28.4–34.8)
MCV RBC AUTO: 77.4 FL (ref 82.6–102.9)
MCV RBC AUTO: 79.4 FL (ref 82.6–102.9)
MONOCYTES # BLD: 10 % (ref 3–12)
MONOCYTES # BLD: 6 % (ref 1–7)
MORPHOLOGY: ABNORMAL
MUCUS: ABNORMAL
NITRITE, URINE: NEGATIVE
NRBC AUTOMATED: 0.2 PER 100 WBC
NRBC AUTOMATED: 0.4 PER 100 WBC
OTHER OBSERVATIONS UA: ABNORMAL
PARTIAL THROMBOPLASTIN TIME: 19.9 SEC (ref 20.5–30.5)
PDW BLD-RTO: 21.5 % (ref 11.8–14.4)
PDW BLD-RTO: 21.9 % (ref 11.8–14.4)
PH UA: 6.5 (ref 5–8)
PLATELET # BLD: 952 K/UL (ref 138–453)
PLATELET # BLD: ABNORMAL K/UL (ref 138–453)
PLATELET ESTIMATE: ABNORMAL
PLATELET ESTIMATE: ABNORMAL
PLATELET, FLUORESCENCE: 1042 K/UL (ref 138–453)
PLATELET, IMMATURE FRACTION: 4 % (ref 1.1–10.3)
PMV BLD AUTO: 9.9 FL (ref 8.1–13.5)
PMV BLD AUTO: ABNORMAL FL (ref 8.1–13.5)
POTASSIUM SERPL-SCNC: 4.1 MMOL/L (ref 3.7–5.3)
PRO-BNP: 2268 PG/ML
PROCALCITONIN: 0.05 NG/ML
PROTEIN UA: ABNORMAL
RBC # BLD: 2.48 M/UL (ref 3.95–5.11)
RBC # BLD: 2.57 M/UL (ref 3.95–5.11)
RBC # BLD: ABNORMAL 10*6/UL
RBC # BLD: ABNORMAL 10*6/UL
RBC UA: ABNORMAL /HPF (ref 0–4)
RENAL EPITHELIAL, UA: ABNORMAL /HPF
RETIC %: 0.5 % (ref 0.5–1.9)
RETIC %: 2.3 % (ref 0.5–1.9)
RETIC HEMOGLOBIN: 16.4 PG (ref 28.2–35.7)
RETIC HEMOGLOBIN: 18.5 PG (ref 28.2–35.7)
SEDIMENTATION RATE, ERYTHROCYTE: 6 MM (ref 0–30)
SEG NEUTROPHILS: 78 % (ref 36–65)
SEG NEUTROPHILS: 78 % (ref 36–66)
SEGMENTED NEUTROPHILS ABSOLUTE COUNT: 7.18 K/UL (ref 1.8–7.7)
SEGMENTED NEUTROPHILS ABSOLUTE COUNT: 7.34 K/UL (ref 1.5–8.1)
SODIUM BLD-SCNC: 138 MMOL/L (ref 135–144)
SPECIFIC GRAVITY UA: 1.02 (ref 1–1.03)
TOTAL IRON BINDING CAPACITY: 362 UG/DL (ref 250–450)
TOTAL PROTEIN: 6.1 G/DL (ref 6.4–8.3)
TOTAL PROTEIN: 6.5 G/DL (ref 6.4–8.3)
TRICHOMONAS: ABNORMAL
TROPONIN INTERP: ABNORMAL
TROPONIN INTERP: ABNORMAL
TROPONIN T: ABNORMAL NG/ML
TROPONIN T: ABNORMAL NG/ML
TROPONIN, HIGH SENSITIVITY: 16 NG/L (ref 0–14)
TROPONIN, HIGH SENSITIVITY: 26 NG/L (ref 0–14)
TSH SERPL DL<=0.05 MIU/L-ACNC: 2.45 MIU/L (ref 0.3–5)
TURBIDITY: CLEAR
UNSATURATED IRON BINDING CAPACITY: 340 UG/DL (ref 112–347)
URINE HGB: NEGATIVE
UROBILINOGEN, URINE: NORMAL
WBC # BLD: 9.2 K/UL (ref 3.5–11.3)
WBC # BLD: 9.4 K/UL (ref 3.5–11.3)
WBC # BLD: ABNORMAL 10*3/UL
WBC # BLD: ABNORMAL 10*3/UL
WBC UA: ABNORMAL /HPF (ref 0–5)
YEAST: ABNORMAL

## 2020-10-13 PROCEDURE — 96365 THER/PROPH/DIAG IV INF INIT: CPT

## 2020-10-13 PROCEDURE — 83880 ASSAY OF NATRIURETIC PEPTIDE: CPT

## 2020-10-13 PROCEDURE — 6370000000 HC RX 637 (ALT 250 FOR IP): Performed by: STUDENT IN AN ORGANIZED HEALTH CARE EDUCATION/TRAINING PROGRAM

## 2020-10-13 PROCEDURE — 84484 ASSAY OF TROPONIN QUANT: CPT

## 2020-10-13 PROCEDURE — 93005 ELECTROCARDIOGRAM TRACING: CPT | Performed by: FAMILY MEDICINE

## 2020-10-13 PROCEDURE — 2060000000 HC ICU INTERMEDIATE R&B

## 2020-10-13 PROCEDURE — 96366 THER/PROPH/DIAG IV INF ADDON: CPT

## 2020-10-13 PROCEDURE — P9016 RBC LEUKOCYTES REDUCED: HCPCS

## 2020-10-13 PROCEDURE — 85730 THROMBOPLASTIN TIME PARTIAL: CPT

## 2020-10-13 PROCEDURE — 86850 RBC ANTIBODY SCREEN: CPT

## 2020-10-13 PROCEDURE — 82607 VITAMIN B-12: CPT

## 2020-10-13 PROCEDURE — 83540 ASSAY OF IRON: CPT

## 2020-10-13 PROCEDURE — 85379 FIBRIN DEGRADATION QUANT: CPT

## 2020-10-13 PROCEDURE — 85055 RETICULATED PLATELET ASSAY: CPT

## 2020-10-13 PROCEDURE — 85025 COMPLETE CBC W/AUTO DIFF WBC: CPT

## 2020-10-13 PROCEDURE — 82728 ASSAY OF FERRITIN: CPT

## 2020-10-13 PROCEDURE — 85045 AUTOMATED RETICULOCYTE COUNT: CPT

## 2020-10-13 PROCEDURE — 81001 URINALYSIS AUTO W/SCOPE: CPT

## 2020-10-13 PROCEDURE — 2580000003 HC RX 258: Performed by: STUDENT IN AN ORGANIZED HEALTH CARE EDUCATION/TRAINING PROGRAM

## 2020-10-13 PROCEDURE — 85652 RBC SED RATE AUTOMATED: CPT

## 2020-10-13 PROCEDURE — 86140 C-REACTIVE PROTEIN: CPT

## 2020-10-13 PROCEDURE — 2580000003 HC RX 258: Performed by: FAMILY MEDICINE

## 2020-10-13 PROCEDURE — 80076 HEPATIC FUNCTION PANEL: CPT

## 2020-10-13 PROCEDURE — 93970 EXTREMITY STUDY: CPT

## 2020-10-13 PROCEDURE — 84145 PROCALCITONIN (PCT): CPT

## 2020-10-13 PROCEDURE — 83010 ASSAY OF HAPTOGLOBIN QUANT: CPT

## 2020-10-13 PROCEDURE — 99284 EMERGENCY DEPT VISIT MOD MDM: CPT

## 2020-10-13 PROCEDURE — 80053 COMPREHEN METABOLIC PANEL: CPT

## 2020-10-13 PROCEDURE — 85018 HEMOGLOBIN: CPT

## 2020-10-13 PROCEDURE — 36415 COLL VENOUS BLD VENIPUNCTURE: CPT

## 2020-10-13 PROCEDURE — 86901 BLOOD TYPING SEROLOGIC RH(D): CPT

## 2020-10-13 PROCEDURE — 83550 IRON BINDING TEST: CPT

## 2020-10-13 PROCEDURE — 99223 1ST HOSP IP/OBS HIGH 75: CPT | Performed by: INTERNAL MEDICINE

## 2020-10-13 PROCEDURE — 83615 LACTATE (LD) (LDH) ENZYME: CPT

## 2020-10-13 PROCEDURE — C9113 INJ PANTOPRAZOLE SODIUM, VIA: HCPCS | Performed by: FAMILY MEDICINE

## 2020-10-13 PROCEDURE — 36430 TRANSFUSION BLD/BLD COMPNT: CPT

## 2020-10-13 PROCEDURE — 86900 BLOOD TYPING SEROLOGIC ABO: CPT

## 2020-10-13 PROCEDURE — 6360000002 HC RX W HCPCS: Performed by: FAMILY MEDICINE

## 2020-10-13 PROCEDURE — 83605 ASSAY OF LACTIC ACID: CPT

## 2020-10-13 PROCEDURE — 71045 X-RAY EXAM CHEST 1 VIEW: CPT

## 2020-10-13 PROCEDURE — 82746 ASSAY OF FOLIC ACID SERUM: CPT

## 2020-10-13 PROCEDURE — 85014 HEMATOCRIT: CPT

## 2020-10-13 PROCEDURE — 6360000002 HC RX W HCPCS: Performed by: STUDENT IN AN ORGANIZED HEALTH CARE EDUCATION/TRAINING PROGRAM

## 2020-10-13 PROCEDURE — 99285 EMERGENCY DEPT VISIT HI MDM: CPT

## 2020-10-13 PROCEDURE — 84443 ASSAY THYROID STIM HORMONE: CPT

## 2020-10-13 PROCEDURE — 86920 COMPATIBILITY TEST SPIN: CPT

## 2020-10-13 PROCEDURE — G0328 FECAL BLOOD SCRN IMMUNOASSAY: HCPCS

## 2020-10-13 PROCEDURE — 93010 ELECTROCARDIOGRAM REPORT: CPT | Performed by: INTERNAL MEDICINE

## 2020-10-13 RX ORDER — M-VIT,TX,IRON,MINS/CALC/FOLIC 27MG-0.4MG
1 TABLET ORAL DAILY
Status: CANCELLED | OUTPATIENT
Start: 2020-10-13

## 2020-10-13 RX ORDER — VITAMIN B COMPLEX
2000 TABLET ORAL DAILY
Status: DISCONTINUED | OUTPATIENT
Start: 2020-10-13 | End: 2020-10-18 | Stop reason: HOSPADM

## 2020-10-13 RX ORDER — POLYETHYLENE GLYCOL 3350 17 G/17G
17 POWDER, FOR SOLUTION ORAL DAILY PRN
Status: CANCELLED | OUTPATIENT
Start: 2020-10-13

## 2020-10-13 RX ORDER — DOCUSATE SODIUM 100 MG/1
100 CAPSULE, LIQUID FILLED ORAL DAILY
Status: DISCONTINUED | OUTPATIENT
Start: 2020-10-13 | End: 2020-10-18 | Stop reason: HOSPADM

## 2020-10-13 RX ORDER — SODIUM CHLORIDE 0.9 % (FLUSH) 0.9 %
10 SYRINGE (ML) INJECTION EVERY 12 HOURS SCHEDULED
Status: CANCELLED | OUTPATIENT
Start: 2020-10-13

## 2020-10-13 RX ORDER — ACETAMINOPHEN 325 MG/1
650 TABLET ORAL EVERY 6 HOURS PRN
Status: DISCONTINUED | OUTPATIENT
Start: 2020-10-13 | End: 2020-10-18 | Stop reason: HOSPADM

## 2020-10-13 RX ORDER — ACETAMINOPHEN 325 MG/1
650 TABLET ORAL EVERY 6 HOURS PRN
Status: CANCELLED | OUTPATIENT
Start: 2020-10-13

## 2020-10-13 RX ORDER — 0.9 % SODIUM CHLORIDE 0.9 %
250 INTRAVENOUS SOLUTION INTRAVENOUS ONCE
Status: COMPLETED | OUTPATIENT
Start: 2020-10-13 | End: 2020-10-13

## 2020-10-13 RX ORDER — ACETAMINOPHEN 650 MG/1
650 SUPPOSITORY RECTAL EVERY 6 HOURS PRN
Status: CANCELLED | OUTPATIENT
Start: 2020-10-13

## 2020-10-13 RX ORDER — PROMETHAZINE HYDROCHLORIDE 12.5 MG/1
12.5 TABLET ORAL EVERY 6 HOURS PRN
Status: CANCELLED | OUTPATIENT
Start: 2020-10-13

## 2020-10-13 RX ORDER — ACETAMINOPHEN 650 MG/1
650 SUPPOSITORY RECTAL EVERY 6 HOURS PRN
Status: DISCONTINUED | OUTPATIENT
Start: 2020-10-13 | End: 2020-10-18 | Stop reason: HOSPADM

## 2020-10-13 RX ORDER — MAGNESIUM SULFATE IN WATER 40 MG/ML
2 INJECTION, SOLUTION INTRAVENOUS PRN
Status: DISCONTINUED | OUTPATIENT
Start: 2020-10-13 | End: 2020-10-18 | Stop reason: HOSPADM

## 2020-10-13 RX ORDER — LEVOTHYROXINE SODIUM 0.07 MG/1
75 TABLET ORAL DAILY
Status: DISCONTINUED | OUTPATIENT
Start: 2020-10-13 | End: 2020-10-18 | Stop reason: HOSPADM

## 2020-10-13 RX ORDER — ASCORBIC ACID 500 MG
500 TABLET ORAL DAILY
Status: DISCONTINUED | OUTPATIENT
Start: 2020-10-13 | End: 2020-10-18 | Stop reason: HOSPADM

## 2020-10-13 RX ORDER — POTASSIUM CHLORIDE 20 MEQ/1
40 TABLET, EXTENDED RELEASE ORAL PRN
Status: DISCONTINUED | OUTPATIENT
Start: 2020-10-13 | End: 2020-10-18 | Stop reason: HOSPADM

## 2020-10-13 RX ORDER — POLYETHYLENE GLYCOL 3350 17 G/17G
17 POWDER, FOR SOLUTION ORAL DAILY PRN
Status: DISCONTINUED | OUTPATIENT
Start: 2020-10-13 | End: 2020-10-15

## 2020-10-13 RX ORDER — ATORVASTATIN CALCIUM 10 MG/1
10 TABLET, FILM COATED ORAL DAILY
Status: DISCONTINUED | OUTPATIENT
Start: 2020-10-13 | End: 2020-10-18 | Stop reason: HOSPADM

## 2020-10-13 RX ORDER — POTASSIUM CHLORIDE 7.45 MG/ML
10 INJECTION INTRAVENOUS PRN
Status: DISCONTINUED | OUTPATIENT
Start: 2020-10-13 | End: 2020-10-18 | Stop reason: HOSPADM

## 2020-10-13 RX ORDER — ONDANSETRON 2 MG/ML
4 INJECTION INTRAMUSCULAR; INTRAVENOUS EVERY 6 HOURS PRN
Status: CANCELLED | OUTPATIENT
Start: 2020-10-13

## 2020-10-13 RX ORDER — SODIUM CHLORIDE 0.9 % (FLUSH) 0.9 %
10 SYRINGE (ML) INJECTION PRN
Status: DISCONTINUED | OUTPATIENT
Start: 2020-10-13 | End: 2020-10-18 | Stop reason: HOSPADM

## 2020-10-13 RX ORDER — SODIUM CHLORIDE 9 MG/ML
INJECTION, SOLUTION INTRAVENOUS CONTINUOUS
Status: DISCONTINUED | OUTPATIENT
Start: 2020-10-13 | End: 2020-10-14

## 2020-10-13 RX ORDER — HEPARIN SODIUM 10000 [USP'U]/100ML
18 INJECTION, SOLUTION INTRAVENOUS CONTINUOUS
Status: DISCONTINUED | OUTPATIENT
Start: 2020-10-13 | End: 2020-10-13 | Stop reason: SINTOL

## 2020-10-13 RX ORDER — SODIUM CHLORIDE 0.9 % (FLUSH) 0.9 %
10 SYRINGE (ML) INJECTION PRN
Status: CANCELLED | OUTPATIENT
Start: 2020-10-13

## 2020-10-13 RX ORDER — SODIUM CHLORIDE 0.9 % (FLUSH) 0.9 %
10 SYRINGE (ML) INJECTION EVERY 12 HOURS SCHEDULED
Status: DISCONTINUED | OUTPATIENT
Start: 2020-10-13 | End: 2020-10-18 | Stop reason: HOSPADM

## 2020-10-13 RX ADMIN — PANTOPRAZOLE SODIUM 80 MG: 40 INJECTION, POWDER, FOR SOLUTION INTRAVENOUS at 10:50

## 2020-10-13 RX ADMIN — OXYCODONE HYDROCHLORIDE AND ACETAMINOPHEN 500 MG: 500 TABLET ORAL at 17:11

## 2020-10-13 RX ADMIN — DOCUSATE SODIUM 100 MG: 100 CAPSULE, LIQUID FILLED ORAL at 18:15

## 2020-10-13 RX ADMIN — HEPARIN SODIUM AND DEXTROSE 18 UNITS/KG/HR: 10000; 5 INJECTION INTRAVENOUS at 18:13

## 2020-10-13 RX ADMIN — ATORVASTATIN CALCIUM 10 MG: 10 TABLET, FILM COATED ORAL at 17:11

## 2020-10-13 RX ADMIN — SODIUM CHLORIDE 20 ML: 9 INJECTION, SOLUTION INTRAVENOUS at 14:29

## 2020-10-13 RX ADMIN — Medication 2000 UNITS: at 17:11

## 2020-10-13 ASSESSMENT — ENCOUNTER SYMPTOMS
RESPIRATORY NEGATIVE: 1
CONSTIPATION: 1
NAUSEA: 0
BLOOD IN STOOL: 1
DIARRHEA: 0
ALLERGIC/IMMUNOLOGIC NEGATIVE: 1
EYES NEGATIVE: 1
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
## 2020-10-13 NOTE — ED PROVIDER NOTES
[facility]  ED  Emergency Department Encounter  EmergencyMedicine Resident     Pt Name:Monique Mobley  MRN: [id number]  [place] 1940  Date of evaluation: 10/13/20  PCP:  Chandler Velasco MD    [address] [place] [address]       Chief Complaint   Patient presents with    Leg Pain     Pt arrives via medics from [place], c/o bilateral leg pain, dx DVT rt leg       HISTORY OF PRESENT ILLNESS  (Location/Symptom, Timing/Onset, Context/Setting, Quality, Duration, Modifying Factors, Severity.)      Landen Waggoner is a 78 y.o. female who presents with left leg pain. Patient is a transfer from [place] [address], presented with pain and swelling of the bilateral lower extremities. Patient has history of HLD, HTN, carotid endarterectomy. Patient was found to have right-sided DVT, anemia with hemoglobin 5.5, transferred to [facility] for further assessment and management. Patient has complaints of bilateral lower extremity pain and swelling. Patient denies fevers, chills, cough, congestion, lightheadedness, dizziness, visual changes, hearing changes, chest pain, shortness of breath, abdominal pain, nausea, vomiting, dysuria, hematuria, diarrhea, constipation, melena, hematochezia, rash, allergies. Patient denies history of clots, is not any anticoagulation or antiplatelet medication at this time. PAST MEDICAL / SURGICAL / SOCIAL / FAMILY HISTORY      has a past medical history of Accident, Arthritis, Back pain, Compression fracture, Dizziness, Heart valve disease, Hx of blood clots, Hyperlipidemia, Hypertension, Osteoporosis, Sprain of lumbar region, Unspecified hypothyroidism, Wears dentures, and Wears glasses. has a past surgical history that includes Appendectomy (1958); Dilation and curettage of uterus (BEFORE 1975); Hysterectomy (1975); and Carotid endarterectomy (Left, 9/25/2017).       Social History     Socioeconomic History    Marital status:      Spouse name: Not on file    Number of children: Not on file    Years of education: Not on file    Highest education level: Not on file   Occupational History    Not on file   Social Needs    Financial resource strain: Not on file    Food insecurity     Worry: Not on file     Inability: Not on file    Transportation needs     Medical: Not on file     Non-medical: Not on file   Tobacco Use    Smoking status: Former Smoker     Packs/day: 0.50     Years: 33.00     Pack years: 16.50     Last attempt to quit: 1987     Years since quittin.0    Smokeless tobacco: Never Used   Substance and Sexual Activity    Alcohol use: No    Drug use: No    Sexual activity: Not on file   Lifestyle    Physical activity     Days per week: Not on file     Minutes per session: Not on file    Stress: Not on file   Relationships    Social connections     Talks on phone: Not on file     Gets together: Not on file     Attends Gnosticist service: Not on file     Active member of club or organization: Not on file     Attends meetings of clubs or organizations: Not on file     Relationship status: Not on file    Intimate partner violence     Fear of current or ex partner: Not on file     Emotionally abused: Not on file     Physically abused: Not on file     Forced sexual activity: Not on file   Other Topics Concern    Not on file   Social History Narrative    Not on file       Family History   Problem Relation Age of Onset    Stroke Mother     High Blood Pressure Mother     Diabetes Father     Heart Disease Father        Allergies:  Sulfa antibiotics    Home Medications:  Prior to Admission medications    Medication Sig Start Date End Date Taking?  Authorizing Provider   levothyroxine (SYNTHROID) 75 MCG tablet TAKE ONE TABLET BY MOUTH DAILY 10/12/20   Henry Nation MD   simvastatin (ZOCOR) 40 MG tablet Take 1 tablet by mouth nightly 10/6/20   Henry Nation MD   Cholecalciferol (VITAMIN D) 2000 units CAPS capsule Take 2,000 Units by mouth daily    Historical Provider, MD   Calcium Citrate-Vitamin D (CITRACAL MAXIMUM PO) Take 1 tablet by mouth daily    Historical Provider, MD   traMADol (ULTRAM) 50 MG tablet Take 50 mg by mouth every 6 hours as needed for Pain. Historical Provider, MD   ascorbic acid (VITAMIN C) 500 MG tablet Take 500 mg by mouth daily. Historical Provider, MD   therapeutic multivitamin-minerals (THERAGRAN-M) tablet Take 1 tablet by mouth daily. Historical Provider, MD   aspirin 81 MG tablet Take 81 mg by mouth daily. Historical Provider, MD       REVIEW OF SYSTEMS    (2-9 systems for level 4, 10 or more for level 5)      Review of Systems   Positive for bilateral lower extremity swelling and pain. Patient denies fevers, chills, cough, congestion, lightheadedness, dizziness, visual changes, hearing changes, chest pain, shortness of breath, abdominal pain, nausea, vomiting, dysuria, hematuria, diarrhea, constipation, melena, hematochezia, rash, allergies. PHYSICAL EXAM   (up to 7 for level 4, 8 or more for level 5)      INITIAL VITALS:   BP (!) 147/60   Pulse 98   Temp 98.6 °F (37 °C)   Resp 14   Ht 5' 2\" (1.575 m)   Wt 110 lb (49.9 kg)   LMP 09/22/1975 (Approximate)   SpO2 100%   BMI 20.12 kg/m²     Physical Exam   Patient is alert and oriented, pale, no acute distress, nontoxic, no respiratory distress, PERRLA, EOMI, CTAB, regular rate and rhythm without extra heart sounds, no abdominal tenderness, bilateral lower extremity swelling and pain to palpation, distal pulses intact and equal bilaterally, capillary refill less than 2 seconds, sensation to light touch intact in all extremities, strength 5/5 in all extremities, guaiac negative.     DIFFERENTIAL  DIAGNOSIS     PLAN (LABS / IMAGING / EKG):  Orders Placed This Encounter   Procedures    Hemoglobin and Hematocrit, Blood, Post Transfusion    FERRITIN    IRON AND TIBC    Troponin    CBC Auto Differential    Path Review, Smear    Reticulocytes    Immature Platelet Fraction effervescent tablet 40 mEq     potassium chloride 10 mEq/100 mL IVPB (Peripheral Line)    magnesium sulfate 2 g in 50 mL IVPB premix    iron sucrose (VENOFER) 200 mg in sodium chloride 0.9 % 100 mL IVPB    docusate sodium (COLACE) capsule 100 mg    heparin 25,000 units in dextrose 5% 250 mL infusion       DDX: DVT, anemia, thrombocytosis    DIAGNOSTIC RESULTS / EMERGENCY DEPARTMENT COURSE / MDM   LAB RESULTS:  Results for orders placed or performed during the hospital encounter of 10/13/20   FERRITIN   Result Value Ref Range    Ferritin 9 (L) 13 - 150 ug/L   IRON AND TIBC   Result Value Ref Range    Iron 22 (L) 37 - 145 ug/dL    TIBC 362 250 - 450 ug/dL    Iron Saturation 6 (L) 20 - 55 %    UIBC 340 112 - 347 ug/dL   Troponin   Result Value Ref Range    Troponin, High Sensitivity 16 (H) 0 - 14 ng/L    Troponin T NOT REPORTED <0.03 ng/mL    Troponin Interp NOT REPORTED    CBC Auto Differential   Result Value Ref Range    WBC 9.2 3.5 - 11.3 k/uL    RBC 2.48 (L) 3.95 - 5.11 m/uL    Hemoglobin 5.5 (LL) 11.9 - 15.1 g/dL    Hematocrit 19.7 (L) 36.3 - 47.1 %    MCV 79.4 (L) 82.6 - 102.9 fL    MCH 22.2 (L) 25.2 - 33.5 pg    MCHC 27.9 (L) 28.4 - 34.8 g/dL    RDW 21.9 (H) 11.8 - 14.4 %    Platelets See Reflexed IPF Result 138 - 453 k/uL    MPV NOT REPORTED 8.1 - 13.5 fL    NRBC Automated 0.2 (H) 0.0 per 100 WBC    Differential Type NOT REPORTED     WBC Morphology NOT REPORTED     RBC Morphology NOT REPORTED     Platelet Estimate NOT REPORTED     Immature Granulocytes 1 (H) 0 %    Seg Neutrophils 78 (H) 36 - 66 %    Lymphocytes 13 (L) 24 - 44 %    Monocytes 6 1 - 7 %    Eosinophils % 0 (L) 1 - 4 %    Basophils 2 0 - 2 %    Absolute Immature Granulocyte 0.09 0.00 - 0.30 k/uL    Segs Absolute 7.18 1.8 - 7.7 k/uL    Absolute Lymph # 1.20 1.0 - 4.8 k/uL    Absolute Mono # 0.55 0.1 - 0.8 k/uL    Absolute Eos # 0.00 0.0 - 0.4 k/uL    Basophils Absolute 0.18 0.0 - 0.2 k/uL    Morphology ANISOCYTOSIS PRESENT     Morphology MICROCYTOSIS PRESENT     Morphology HYPOCHROMIA PRESENT     Morphology 1+ POLYCHROMASIA     Morphology 1+ SCHISTOCYTES    Reticulocytes   Result Value Ref Range    Retic % 2.3 (H) 0.5 - 1.9 %    Absolute Retic # 0.060 0.030 - 0.080 M/uL    Immature Retic Fract 20.900 (H) 2.7 - 18.3 %    Retic Hemoglobin 16.4 (L) 28.2 - 35.7 pg   Immature Platelet Fraction   Result Value Ref Range    Platelet, Immature Fraction 4.0 1.1 - 10.3 %    Platelet, Fluorescence 1,042 (HH) 138 - 453 k/uL   HEMOGLOBIN AND HEMATOCRIT, BLOOD   Result Value Ref Range    Hemoglobin 7.0 (LL) 11.9 - 15.1 g/dL    Hematocrit 23.8 (L) 36.3 - 47.1 %   APTT   Result Value Ref Range    PTT 19.9 (L) 20.5 - 30.5 sec   Sedimentation Rate   Result Value Ref Range    Sed Rate 6 0 - 30 mm   Reticulocytes   Result Value Ref Range    Retic % 0.5 0.5 - 1.9 %    Absolute Retic # 0.020 (L) 0.030 - 0.080 M/uL    Immature Retic Fract 11.200 2.7 - 18.3 %    Retic Hemoglobin 18.5 (L) 28.2 - 35.7 pg   TYPE AND SCREEN   Result Value Ref Range    Expiration Date 10/16/2020,2359     Arm Band Number [id number]     ABO/Rh O POSITIVE     Antibody Screen NEGATIVE     Unit Number [id number]     Product Code Leukocyte Reduced Red Cell     Unit Divison 00     Dispense Status ISSUED     Transfusion Status OK TO TRANSFUSE     Crossmatch Result COMPATIBLE        IMPRESSION: 67-year-old female with microcytic anemia, thrombocytosis, DVT, guaiac negative. Will transfuse 1 unit packed red blood cells, start heparin for DVT after the bolus is complete. Will admit patient to internal medicine for further assessment and management. RADIOLOGY:  Xr Chest (single View Frontal)    Result Date: 10/13/2020  EXAMINATION: ONE XRAY VIEW OF THE CHEST 10/13/2020 8:24 am COMPARISON: None. HISTORY: ORDERING SYSTEM PROVIDED HISTORY: perla TECHNOLOGIST PROVIDED HISTORY: perla FINDINGS: Central silhouette appears enlarged. Atherosclerotic calcifications are identified. Exaggerated kyphotic curvature. Mildly prominent interstitial changes are identified diffusely, with right basilar airspace disease and a small right pleural effusion. No pneumothorax is identified. No acute osseous abnormality is identified. Degenerative changes in the shoulders. Calcific tendinitis of the right rotator cuff. Mildly prominent interstitial change seen diffusely within the lungs which may represent mild pulmonary edema. There is also a right basilar infiltrate and small effusion which could represent pneumonia. Enlargement of the central silhouette may be related to cardiomegaly, pericardial effusion, as well as possible hiatal hernia. Patient had a small hiatal hernia noted on CT imaging in 2013. Vl Dup Lower Extremity Venous Bilateral    Result Date: 10/13/2020    [facility]  Vascular Lower Extremities DVT Study Procedure   Patient Name   [facility]  Date of Study           10/13/2020                 L   Date of Birth  1940   Gender                  Female   Age            78 year(s)   Race                       Room Number    05   Corporate ID # [id number]   Patient Acct # [de-identified]   MR #           [id number]       Sonographer             BHAVIK Interiano   Accession #    [id number]  Interpreting Physician  Be Parsons   Referring                   Referring Physician     Tabatha Olivera, [address] [place] [address]  Nurse  Practitioner  Procedure Type of Study:   Veins: Lower Extremities DVT Study, Venous Scan Lower Bilateral.  Patient Status:ER. - Critical Result:Reported to Dr. Tabatha Olivera, 10/13/2020 @ 4192. Comments:INDICATIONS: lower extremity edema  Conclusions   Summary   Acute/sub-acute deep vein thrombosis of the right leg involving the  gastrocnemius veins. Superficial thrombophlebitis of the right small saphenous vein in the  proximal calf.    Signature   ----------------------------------------------------------------  Electronically signed by BHAVIK Interiano(Sonographer) on  10/13/2020 09:23 AM ----------------------------------------------------------------   ----------------------------------------------------------------  Electronically signed by Diana ChauInterpreting physician) on  10/13/2020 12:57 PM  ----------------------------------------------------------------  Findings:   Right Impression:                         Left Impression:  The common femoral, femoral, deep         The common femoral, femoral,  femoral, popliteal, tibials, peroneal,    deep femoral, popliteal,  and saphenous veins were evaluated. tibials, peroneal, and saphenous                                            veins were evaluated. A single gastoc/soleal vein in the mid  calf and the SSV in the proximal calf     All veins were compressible. were distended with soft to dense  homogeneous echoes, not compressible, and The deep veins were phasic with  with no flow. normal augmentation but with a                                            pulsatile component. All other veins were compressible. Edema was noted from the ankle  The deep veins were phasic with normal    to the mid thigh. augmentation but with a pulsatile  component. Edema was noted from the ankle to the mid  thigh. Risk Factors History +---------+----------+-----------------------------------------------------+ ! Diagnosis! Date      ! Comments                                             ! +---------+----------+-----------------------------------------------------+ ! Other    !10/11/2017! Lt. carotid endarterectomy with bovine pericardial   ! !         !          !patch angioplasty                                    !  +---------+----------+-----------------------------------------------------+ Velocities are measured in cm/s ; Diameters are measured in cm Right Lower Extremities DVT Study Measurements Right 2D Measurements +------------------------------------+----------+---------------+----------+ ! Location                            ! Visualized! Compressibility! Thrombosis! +------------------------------------+----------+---------------+----------+ ! Common Femoral                      !Yes       ! Yes            ! None      ! +------------------------------------+----------+---------------+----------+ ! Prox Femoral                        !Yes       ! Yes            ! None      ! +------------------------------------+----------+---------------+----------+ ! Mid Femoral                         !Yes       ! Yes            ! None      ! +------------------------------------+----------+---------------+----------+ ! Dist Femoral                        !Yes       ! Yes            ! None      ! +------------------------------------+----------+---------------+----------+ ! Deep Femoral                        !Yes       ! Yes            ! None      ! +------------------------------------+----------+---------------+----------+ ! Popliteal                           !Yes       ! Yes            ! None      ! +------------------------------------+----------+---------------+----------+ ! Sapheno Femoral Junction            ! Yes       ! Yes            ! None      ! +------------------------------------+----------+---------------+----------+ ! PTV                                 ! Yes       ! Yes            ! None      ! +------------------------------------+----------+---------------+----------+ ! Peroneal                            !Yes       ! Yes            ! None      ! +------------------------------------+----------+---------------+----------+ ! Gastroc                             ! Yes       ! No             !Acute     ! +------------------------------------+----------+---------------+----------+ ! GSV Thigh                           ! Yes       ! Yes            ! None      ! +------------------------------------+----------+---------------+----------+ ! GSV Knee !Yes       !Yes            ! None      ! +------------------------------------+----------+---------------+----------+ ! Deep Femoral                        !Yes       ! Yes            ! None      ! +------------------------------------+----------+---------------+----------+ ! Popliteal                           !Yes       ! Yes            ! None      ! +------------------------------------+----------+---------------+----------+ ! Sapheno Femoral Junction            ! Yes       ! Yes            ! None      ! +------------------------------------+----------+---------------+----------+ ! PTV                                 ! Yes       ! Yes            ! None      ! +------------------------------------+----------+---------------+----------+ ! Peroneal                            !Yes       ! Yes            ! None      ! +------------------------------------+----------+---------------+----------+ ! Gastroc                             ! Yes       ! Yes            ! None      ! +------------------------------------+----------+---------------+----------+ ! GSV Thigh                           ! Yes       ! Yes            ! None      ! +------------------------------------+----------+---------------+----------+ ! GSV Knee                            ! Yes       ! Yes            ! None      ! +------------------------------------+----------+---------------+----------+ ! GSV Ankle                           ! Yes       ! Yes            ! None      ! +------------------------------------+----------+---------------+----------+ ! SSV                                 ! Yes       ! Yes            ! None      ! +------------------------------------+----------+---------------+----------+ Left Doppler Measurements +---------------------------+------+------+--------------------------------+ ! Location                   ! Signal!Reflux! Reflux (msec)                   ! +---------------------------+------+------+--------------------------------+ ! Common Femoral !Phasic!      !                                ! +---------------------------+------+------+--------------------------------+ ! Prox Femoral               !Phasic!      !                                ! +---------------------------+------+------+--------------------------------+ ! Popliteal                  !Phasic!      !                                ! +---------------------------+------+------+--------------------------------+      EKG      All EKG's are interpreted by the Emergency Department Physician who either signs or Co-signs this chart in the absence of a cardiologist.    EMERGENCY DEPARTMENT COURSE:  Patient came to emergency department, HPI and physical exam were conducted. All nursing notes were reviewed. Transfuse 1 unit packed red blood cells, repeat hemoglobin 7.0. Patient was admitted to the internal medicine team for further assessment and management of patient's problems. Patient on heparin drip. PROCEDURES:      CONSULTS:  IP CONSULT TO INTERNAL MEDICINE  IP CONSULT TO GI  IP CONSULT TO CASE MANAGEMENT  IP CONSULT TO HEM/ONC    CRITICAL CARE:  Please see attending note    FINAL IMPRESSION      1. DVT (deep vein thrombosis) in pregnancy    2. Anemia, unspecified type          DISPOSITION / PLAN     DISPOSITION Admitted 10/13/2020 01:38:08 PM      PATIENT REFERRED TO:  No follow-up provider specified.     DISCHARGE MEDICATIONS:  New Prescriptions    No medications on file       Ramin Armenta MD  Emergency Medicine Resident    (Please note that portions of thisnote were completed with a voice recognition program.  Efforts were made to edit the dictations but occasionally words are mis-transcribed.)        Ramin Armenta MD  Resident  10/13/20 [id number]

## 2020-10-13 NOTE — ED PROVIDER NOTES
[facility]  Emergency Department  Faculty Attestation     I performed a history and physical examination of the patient and discussed management with the resident. I reviewed the residents note and agree with the documented findings and plan of care. Any areas of disagreement are noted on the chart. I was personally present for the key portions of any procedures. I have documented in the chart those procedures where I was not present during the key portions. I have reviewed the emergency nurses triage note. I agree with the chief complaint, past medical history, past surgical history, allergies, medications, social and family history as documented unless otherwise noted below. For Physician Assistant/ Nurse Practitioner cases/documentation I have personally evaluated this patient and have completed at least one if not all key elements of the E/M (history, physical exam, and MDM). Additional findings are as noted. Primary Care Physician:  Brandy Lauren MD    Screenings:  [unfilled]    CHIEF COMPLAINT       Chief Complaint   Patient presents with    Leg Pain     Pt arrives via medics from [place], c/o bilateral leg pain, dx DVT rt leg       RECENT VITALS:    ,   ,  ,      LABS:  Labs Reviewed - No data to display    Radiology  No orders to display           EKG:      Attending Physician Additional  Notes    Patient is transferred here from outside hospital, admission is planned but no bed available. She has anemia, lightheadedness and dizziness when upright, bilateral leg pain with swelling, DVT in the right lower extremity. No anticoagulation started. No known source for anemia. No blood in the stools. No recent vomiting. No vaginal bleeding. Status post total hysterectomy, no blood in the urine. No known bone marrow disorder or iron deficiency. No chest pain or shortness of breath. No known history of CHF by her history.   She does have chronic recurrent back pain and gets injections at the pain clinic. On exam she is alert and oriented no distress moderately pale. She has JVD. As she has bilateral calf edema symmetrical in nature with minimal tenderness. Normal pulses. Abdomen is soft and nontender. Lungs are clear. She does have systolic murmur at the apex. Impression is anemia, DVT, CHF. Plan is reviewed labs from outside hospital, rectal exam, admit to hospitalist, blood transfusion, consider starting heparin if no active bleeding noted, review CBC and indices. [facility].  Sunny Patino MD, [facility]  Attending Emergency  Physician               Jacqueline Daley MD  10/13/20 [id number]       Jacqueline Daley MD  10/13/20 [id number]

## 2020-10-13 NOTE — ED NOTES
Bed: 16  Expected date:   Expected time:   Means of arrival:   Comments:  Elio Dumont RN  10/13/20 3740

## 2020-10-13 NOTE — ED NOTES
Called Dr. Thania Garcia via office number he was with a patient and will call us back.      Aparna Marie  10/13/20 0905
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
## 2020-10-13 NOTE — CARE COORDINATION
Case Management Initial Discharge Plan  Kenyatta Weinstein,             Met with:patient to discuss discharge plans. Information verified: address, contacts, phone number, , insurance Yes    Emergency Contact/Next of Kin name & number: Kavitha Agrawal daughter [phone]    PCP: Shannon Robles MD  Date of last visit: 2 weeks    Insurance Provider: [facility] Medicare    Discharge Planning    Living Arrangements:  Spouse/Significant Other   Support Systems:  Spouse/Significant Other, Family Members, [id number] has 1 stories  3 stairs to climb to get into front door, 0stairs to climb to reach second floor  Location of bedroom/bathroom in home main    Patient able to perform ADL's:Independent    Current Services (outpatient & in home) none pt wants [employer]  DME equipment: walker  DME provider:     Receiving oral anticoagulation therapy? Yes    If indicated:   Physician managing anticoagulation treatment: Just started  Where does patient obtain lab work for ATC treatment? Pt is unsure      Potential Assistance Needed:  N/A    Patient agreeable to home care: Yes wants [phone] of choice provided:  yes    Prior SNF/Rehab Placement and Facility: none  Agreeable to SNF/Rehab: No  [place] of choice provided: n/a     Evaluation: no    Expected Discharge date:       Patient expects to be discharged to:  home  Follow Up Appointment: Best Day/ Time:      Transportation provider: Family  Transportation arrangements needed for discharge: No    Readmission Risk              Risk of Unplanned Readmission:        16             Does patient have a readmission risk score greater than 14?: Yes  If yes, follow-up appointment must be made within 7 days of discharge. Goals of Care:  To get rid of blood clot      Discharge Plan: Pt would like [facility] to help her get stronger referral was sent pcp established ,family provides tranpsortation          Electronically signed by Gary Solis RN on 10/13/20 at 7:28 PM EDT

## 2020-10-13 NOTE — ED PROVIDER NOTES
FACULTY SIGN-OUT  ADDENDUM     Care of this patient was assumed from previous attending physician. The patient's initial evaluation and plan have been discussed with the prior provider who initially evaluated the patient. Attestation  I was available and discussed any additional care issues that arose and coordinated the management plans with the resident(s) caring for the patient during my duty period. Any areas of disagreement with resident's documentation of care or procedures are noted on the chart. I was personally present for the key portions of any/all procedures, during my duty period. I have documented in the chart those procedures where I was not present during the key portions. ED COURSE      The patient was given the following medications:  Orders Placed This Encounter   Medications    0.9 % sodium chloride bolus       RECENT VITALS:   Temp: 98.6 °F (37 °C), Pulse: 93, Resp: 18, BP: (!) 141/61    MEDICAL DECISION MAKING        Kenyatta Weinstein is a 78 y.o. female who presents to the Emergency Department with complaints of R sided DVT and anemia so not anticoagulated. Hemoccult neg. Plan to transfuse PRBC. Admitted, awaiting bed.       Jonna Leyva MD  Attending Emergency Physician    (Please note that portions of this note were completed with a voice recognition program.  Efforts were made to edit the dictations but occasionally words are mis-transcribed.)          Jonna Leyva MD  10/13/20 4039

## 2020-10-13 NOTE — DISCHARGE INSTR - COC
Continuity of Care Form    Patient Name: Shantal Coyle   :  1940  MRN:  [id number]    Admit date:  10/13/2020  Discharge date:  10/17/20    Code Status Order: Full Code   Advance Directives:      Admitting Physician:  Gasper Phillip MD  PCP: Ghada Aguilar MD    Discharging Nurse: Sarah Disla RN  [address] Unit/Room#: 1616  Discharging Unit Phone Number: [phone]    Emergency Contact:   Extended Emergency Contact Information  Primary Emergency Contact: [address] Phone: [phone]  Relation: Child   needed? No  Secondary Emergency Contact: Anant Ulloa  Address: [address] [facility], [address] [place] [address]  Home Phone: [phone]  Relation: Spouse   needed?  No    Past Surgical History:  Past Surgical History:   Procedure Laterality Date    APPENDECTOMY      CAROTID ENDARTERECTOMY Left 2017    LEFT CAROTID ENDARTERECTOMY, XENOSURE BIOLOGIC PATCH performed by Marlena Mcbride DO at [address] [facility]    X 4 PRIOR TO [facility]    TOTAL        Immunization History:   Immunization History   Administered Date(s) Administered    Influenza Vaccine, unspecified formulation 10/10/2015, 2016    Influenza, Triv, inactivated, subunit, adjuvanted, IM (Fluad 65 yrs and older) 2017, 10/31/2018, 2019    Pneumococcal Conjugate 13-valent ([id number]) 2017    Pneumococcal Polysaccharide ([id number]) 2018       Active Problems:  Patient Active Problem List   Diagnosis Code    Hypothyroidism E03.9    Chronic back pain M54.9, G89.29    Osteoporosis M81.0    Essential hypertension I10    Hyperlipidemia E78.5    Urinary incontinence R32    Stenosis of left carotid artery I65.22    Pulmonary hypertension (HCC) I27.20    Non-rheumatic mitral regurgitation I34.0    Non-rheumatic tricuspid valve insufficiency I36.1    Spondylosis of lumbosacral region without myelopathy or radiculopathy M47.817    Anemia D64.9    Thrombocytosis (HCC) D47.3    History of carotid endarterectomy Z98.890    Constipation K59.00    Severe tricuspid regurgitation I07.1    Acute deep vein thrombosis (DVT) of right lower extremity (HCC) I82.401       Isolation/Infection:   Isolation            No Isolation          Patient Infection Status       None to display            Nurse Assessment:  Last Vital Signs: BP (!) 126/56   Pulse 90   Temp 98.6 °F (37 °C)   Resp (!) 38   Ht 5' 2\" (1.575 m)   Wt 110 lb (49.9 kg)   LMP 09/22/1975 (Approximate)   SpO2 95%   BMI 20.12 kg/m²     Last documented pain score (0-10 scale):    Last Weight:   Wt Readings from Last 1 Encounters:   10/13/20 110 lb (49.9 kg)     Mental Status:  oriented, alert, coherent, logical, thought processes intact and able to concentrate and follow conversation    IV Access:  - None    Nursing Mobility/ADLs:  Walking   Assisted  Transfer  Assisted  Bathing  Assisted  Dressing  Assisted  [address]  Med Delivery   whole    Wound Care Documentation and Therapy:  Incision 09/25/17 Chest Left (Active)   Number of days: 1114       Incision 09/25/17 Neck Left (Active)   Number of days: 1114        Elimination:  Continence: Bowel: Yes  Bladder: Yes  Urinary Catheter: None   Colostomy/Ileostomy/Ileal Conduit: No       Date of Last BM: 10/15/20    Intake/Output Summary (Last 24 hours) at 10/13/2020 1933  Last data filed at 10/13/2020 1604  Gross per 24 hour   Intake 350 ml   Output --   Net 350 ml     No intake/output data recorded. Safety Concerns: At Risk for Falls    Impairments/Disabilities:      None    Nutrition Therapy:  Current Nutrition Therapy:   - Oral Diet:  General    Routes of Feeding: Oral  Liquids:  Thin Liquids  Daily Fluid Restriction: no  Last Modified Barium Swallow with Video (Video Swallowing Test): not done    Treatments at the Time of Hospital Discharge:   Respiratory Treatments: none  Oxygen Therapy:  is not on home oxygen therapy. Ventilator:    - No ventilator support    Rehab Therapies: Physical Therapy and Occupational Therapy  Weight Bearing Status/Restrictions: No weight bearing restirctions  Other Medical Equipment (for information only, NOT a DME order):  walker  Other Treatments: none    Patient's personal belongings (please select all that are sent with patient):  Ben    RN SIGNATURE:  Electronically signed by Gurwinder Davis RN on 10/17/20 at 3:44 PM EDT    CASE MANAGEMENT/SOCIAL WORK SECTION    Inpatient Status Date: 10/13/2020    Readmission Risk Assessment Score:  Readmission Risk              Risk of Unplanned Readmission:        16           Discharging to Facility/ Agency   Name:   [facility] [place] [address]  Address:    [place] [address] [facility] [address] [address]  Phone:   [phone]  Fax:    [phone]    Discharging to Agency  Name:    [facility]  Address:  Phone:   [phone]  Fax:     [phone]    / signature: Electronically signed by Marlena Fowler RN on 10/18/20 at 8:15 AM EDT    PHYSICIAN SECTION    Prognosis: Fair    Condition at Discharge: Stable    Rehab Potential (if transferring to Rehab): Fair    Recommended Labs or Other Treatments After Discharge:   PT and OT eval and treat  CBC check on 10/19/2020 and then weekly for 3 weeks results to PCP and nursing home doctor  Follow-up with hematology Dr. Honey Joyce with GI  Monitor for any bleeding  Monitor vitals  Culture of urine is pending, nursing home doctor to follow-up on the results  Physician Certification: I certify the above information and transfer of Kiesha Love  is necessary for the continuing treatment of the diagnosis listed and that she requires [facility] for less 30 days.      Update Admission H&P: Changes in H&P as follows - see discharge summary    PHYSICIAN SIGNATURE:  Electronically signed by Roxi Jeong MD on 10/16/20 at 1:25 PM EDT

## 2020-10-14 ENCOUNTER — ANESTHESIA EVENT (OUTPATIENT)
Dept: ENDOSCOPY | Age: 80
DRG: 812 | End: 2020-10-14
Payer: MEDICARE

## 2020-10-14 ENCOUNTER — ANESTHESIA (OUTPATIENT)
Dept: ENDOSCOPY | Age: 80
DRG: 812 | End: 2020-10-14
Payer: MEDICARE

## 2020-10-14 ENCOUNTER — APPOINTMENT (OUTPATIENT)
Dept: CT IMAGING | Age: 80
DRG: 812 | End: 2020-10-14
Payer: MEDICARE

## 2020-10-14 VITALS
OXYGEN SATURATION: 96 % | DIASTOLIC BLOOD PRESSURE: 54 MMHG | SYSTOLIC BLOOD PRESSURE: 119 MMHG | RESPIRATION RATE: 9 BRPM

## 2020-10-14 LAB
ABO/RH: NORMAL
ABSOLUTE EOS #: 0.14 K/UL (ref 0–0.44)
ABSOLUTE IMMATURE GRANULOCYTE: 0.07 K/UL (ref 0–0.3)
ABSOLUTE LYMPH #: 1.24 K/UL (ref 1.1–3.7)
ABSOLUTE MONO #: 1.02 K/UL (ref 0.1–1.2)
ABSOLUTE RETIC #: 0.07 M/UL (ref 0.03–0.08)
ANION GAP SERPL CALCULATED.3IONS-SCNC: 13 MMOL/L (ref 9–17)
ANTIBODY SCREEN: NEGATIVE
ARM BAND NUMBER: NORMAL
BASOPHILS # BLD: 1 % (ref 0–2)
BASOPHILS ABSOLUTE: 0.11 K/UL (ref 0–0.2)
BLD PROD TYP BPU: NORMAL
BUN BLDV-MCNC: 17 MG/DL (ref 8–23)
BUN/CREAT BLD: ABNORMAL (ref 9–20)
CALCIUM SERPL-MCNC: 8.1 MG/DL (ref 8.6–10.4)
CARCINOEMBRYONIC ANTIGEN: 2.1 NG/ML
CHLORIDE BLD-SCNC: 105 MMOL/L (ref 98–107)
CO2: 21 MMOL/L (ref 20–31)
CREAT SERPL-MCNC: 0.95 MG/DL (ref 0.5–0.9)
CROSSMATCH RESULT: NORMAL
DIFFERENTIAL TYPE: ABNORMAL
DISPENSE STATUS BLOOD BANK: NORMAL
EOSINOPHILS RELATIVE PERCENT: 1 % (ref 1–4)
EXPIRATION DATE: NORMAL
GFR AFRICAN AMERICAN: >60 ML/MIN
GFR NON-AFRICAN AMERICAN: 56 ML/MIN
GFR SERPL CREATININE-BSD FRML MDRD: ABNORMAL ML/MIN/{1.73_M2}
GFR SERPL CREATININE-BSD FRML MDRD: ABNORMAL ML/MIN/{1.73_M2}
GLUCOSE BLD-MCNC: 91 MG/DL (ref 70–99)
HCT VFR BLD CALC: 24.9 % (ref 36.3–47.1)
HEMOGLOBIN: 7.2 G/DL (ref 11.9–15.1)
IMMATURE GRANULOCYTES: 1 %
IMMATURE RETIC FRACT: 51.3 % (ref 2.7–18.3)
LV EF: 53 %
LVEF MODALITY: NORMAL
LYMPHOCYTES # BLD: 13 % (ref 24–43)
MCH RBC QN AUTO: 23.3 PG (ref 25.2–33.5)
MCHC RBC AUTO-ENTMCNC: 28.9 G/DL (ref 28.4–34.8)
MCV RBC AUTO: 80.6 FL (ref 82.6–102.9)
MONOCYTES # BLD: 10 % (ref 3–12)
NRBC AUTOMATED: 0.4 PER 100 WBC
PDW BLD-RTO: 20 % (ref 11.8–14.4)
PLATELET # BLD: 973 K/UL (ref 138–453)
PLATELET ESTIMATE: ABNORMAL
PMV BLD AUTO: 10.5 FL (ref 8.1–13.5)
POTASSIUM SERPL-SCNC: 3.8 MMOL/L (ref 3.7–5.3)
RBC # BLD: 3.09 M/UL (ref 3.95–5.11)
RBC # BLD: ABNORMAL 10*6/UL
RETIC %: 2.1 % (ref 0.5–1.9)
RETIC HEMOGLOBIN: 17.7 PG (ref 28.2–35.7)
SARS-COV-2, RAPID: NOT DETECTED
SARS-COV-2: NORMAL
SARS-COV-2: NORMAL
SEG NEUTROPHILS: 74 % (ref 36–65)
SEGMENTED NEUTROPHILS ABSOLUTE COUNT: 7.27 K/UL (ref 1.5–8.1)
SODIUM BLD-SCNC: 139 MMOL/L (ref 135–144)
SOURCE: NORMAL
SURGICAL PATHOLOGY REPORT: NORMAL
TRANSFUSION STATUS: NORMAL
TROPONIN INTERP: ABNORMAL
TROPONIN T: ABNORMAL NG/ML
TROPONIN, HIGH SENSITIVITY: 22 NG/L (ref 0–14)
UNIT DIVISION: 0
UNIT NUMBER: NORMAL
WBC # BLD: 9.9 K/UL (ref 3.5–11.3)
WBC # BLD: ABNORMAL 10*3/UL

## 2020-10-14 PROCEDURE — 6370000000 HC RX 637 (ALT 250 FOR IP): Performed by: STUDENT IN AN ORGANIZED HEALTH CARE EDUCATION/TRAINING PROGRAM

## 2020-10-14 PROCEDURE — 3700000001 HC ADD 15 MINUTES (ANESTHESIA): Performed by: INTERNAL MEDICINE

## 2020-10-14 PROCEDURE — 93306 TTE W/DOPPLER COMPLETE: CPT

## 2020-10-14 PROCEDURE — 88305 TISSUE EXAM BY PATHOLOGIST: CPT

## 2020-10-14 PROCEDURE — 99232 SBSQ HOSP IP/OBS MODERATE 35: CPT | Performed by: INTERNAL MEDICINE

## 2020-10-14 PROCEDURE — 85014 HEMATOCRIT: CPT

## 2020-10-14 PROCEDURE — 6360000004 HC RX CONTRAST MEDICATION: Performed by: INTERNAL MEDICINE

## 2020-10-14 PROCEDURE — 2060000000 HC ICU INTERMEDIATE R&B

## 2020-10-14 PROCEDURE — 3700000000 HC ANESTHESIA ATTENDED CARE: Performed by: INTERNAL MEDICINE

## 2020-10-14 PROCEDURE — 6370000000 HC RX 637 (ALT 250 FOR IP): Performed by: INTERNAL MEDICINE

## 2020-10-14 PROCEDURE — 6360000002 HC RX W HCPCS: Performed by: INTERNAL MEDICINE

## 2020-10-14 PROCEDURE — 85018 HEMOGLOBIN: CPT

## 2020-10-14 PROCEDURE — 80048 BASIC METABOLIC PNL TOTAL CA: CPT

## 2020-10-14 PROCEDURE — U0002 COVID-19 LAB TEST NON-CDC: HCPCS

## 2020-10-14 PROCEDURE — 0DB68ZX EXCISION OF STOMACH, VIA NATURAL OR ARTIFICIAL OPENING ENDOSCOPIC, DIAGNOSTIC: ICD-10-PCS | Performed by: INTERNAL MEDICINE

## 2020-10-14 PROCEDURE — 99221 1ST HOSP IP/OBS SF/LOW 40: CPT | Performed by: NURSE PRACTITIONER

## 2020-10-14 PROCEDURE — 6360000002 HC RX W HCPCS: Performed by: STUDENT IN AN ORGANIZED HEALTH CARE EDUCATION/TRAINING PROGRAM

## 2020-10-14 PROCEDURE — 36415 COLL VENOUS BLD VENIPUNCTURE: CPT

## 2020-10-14 PROCEDURE — 74177 CT ABD & PELVIS W/CONTRAST: CPT

## 2020-10-14 PROCEDURE — 81270 JAK2 GENE: CPT

## 2020-10-14 PROCEDURE — 6360000002 HC RX W HCPCS: Performed by: NURSE PRACTITIONER

## 2020-10-14 PROCEDURE — 6360000002 HC RX W HCPCS: Performed by: NURSE ANESTHETIST, CERTIFIED REGISTERED

## 2020-10-14 PROCEDURE — 2580000003 HC RX 258: Performed by: NURSE PRACTITIONER

## 2020-10-14 PROCEDURE — 83615 LACTATE (LD) (LDH) ENZYME: CPT

## 2020-10-14 PROCEDURE — 2500000003 HC RX 250 WO HCPCS: Performed by: NURSE ANESTHETIST, CERTIFIED REGISTERED

## 2020-10-14 PROCEDURE — 2580000003 HC RX 258: Performed by: INTERNAL MEDICINE

## 2020-10-14 PROCEDURE — 82378 CARCINOEMBRYONIC ANTIGEN: CPT

## 2020-10-14 PROCEDURE — 85025 COMPLETE CBC W/AUTO DIFF WBC: CPT

## 2020-10-14 PROCEDURE — 99223 1ST HOSP IP/OBS HIGH 75: CPT | Performed by: INTERNAL MEDICINE

## 2020-10-14 PROCEDURE — 3609012400 HC EGD TRANSORAL BIOPSY SINGLE/MULTIPLE: Performed by: INTERNAL MEDICINE

## 2020-10-14 PROCEDURE — 2580000003 HC RX 258: Performed by: STUDENT IN AN ORGANIZED HEALTH CARE EDUCATION/TRAINING PROGRAM

## 2020-10-14 PROCEDURE — 2709999900 HC NON-CHARGEABLE SUPPLY: Performed by: INTERNAL MEDICINE

## 2020-10-14 PROCEDURE — 7100000011 HC PHASE II RECOVERY - ADDTL 15 MIN: Performed by: INTERNAL MEDICINE

## 2020-10-14 PROCEDURE — 93971 EXTREMITY STUDY: CPT

## 2020-10-14 PROCEDURE — 84484 ASSAY OF TROPONIN QUANT: CPT

## 2020-10-14 PROCEDURE — 7100000010 HC PHASE II RECOVERY - FIRST 15 MIN: Performed by: INTERNAL MEDICINE

## 2020-10-14 PROCEDURE — 85045 AUTOMATED RETICULOCYTE COUNT: CPT

## 2020-10-14 RX ORDER — LIDOCAINE HYDROCHLORIDE 10 MG/ML
INJECTION, SOLUTION INFILTRATION; PERINEURAL PRN
Status: DISCONTINUED | OUTPATIENT
Start: 2020-10-14 | End: 2020-10-14 | Stop reason: SDUPTHER

## 2020-10-14 RX ORDER — SODIUM CHLORIDE 9 MG/ML
INJECTION, SOLUTION INTRAVENOUS CONTINUOUS
Status: DISCONTINUED | OUTPATIENT
Start: 2020-10-14 | End: 2020-10-14

## 2020-10-14 RX ORDER — TRAMADOL HYDROCHLORIDE 50 MG/1
100 TABLET ORAL ONCE
Status: COMPLETED | OUTPATIENT
Start: 2020-10-14 | End: 2020-10-14

## 2020-10-14 RX ORDER — PANTOPRAZOLE SODIUM 40 MG/1
40 TABLET, DELAYED RELEASE ORAL
Status: DISCONTINUED | OUTPATIENT
Start: 2020-10-15 | End: 2020-10-18 | Stop reason: HOSPADM

## 2020-10-14 RX ORDER — ASPIRIN 81 MG/1
81 TABLET, CHEWABLE ORAL DAILY
Status: DISCONTINUED | OUTPATIENT
Start: 2020-10-14 | End: 2020-10-14

## 2020-10-14 RX ORDER — LABETALOL HYDROCHLORIDE 5 MG/ML
10 INJECTION, SOLUTION INTRAVENOUS EVERY 6 HOURS PRN
Status: DISCONTINUED | OUTPATIENT
Start: 2020-10-14 | End: 2020-10-18 | Stop reason: HOSPADM

## 2020-10-14 RX ORDER — TRAMADOL HYDROCHLORIDE 50 MG/1
50 TABLET ORAL ONCE
Status: COMPLETED | OUTPATIENT
Start: 2020-10-14 | End: 2020-10-14

## 2020-10-14 RX ORDER — HEPARIN SODIUM 5000 [USP'U]/ML
5000 INJECTION, SOLUTION INTRAVENOUS; SUBCUTANEOUS EVERY 8 HOURS SCHEDULED
Status: DISCONTINUED | OUTPATIENT
Start: 2020-10-14 | End: 2020-10-16

## 2020-10-14 RX ORDER — SODIUM CHLORIDE 9 MG/ML
10 INJECTION INTRAVENOUS 2 TIMES DAILY
Status: DISCONTINUED | OUTPATIENT
Start: 2020-10-16 | End: 2020-10-18 | Stop reason: HOSPADM

## 2020-10-14 RX ORDER — PANTOPRAZOLE SODIUM 40 MG/10ML
40 INJECTION, POWDER, LYOPHILIZED, FOR SOLUTION INTRAVENOUS 2 TIMES DAILY
Status: DISCONTINUED | OUTPATIENT
Start: 2020-10-16 | End: 2020-10-14

## 2020-10-14 RX ORDER — DIPHENHYDRAMINE HYDROCHLORIDE 50 MG/ML
12.5 INJECTION INTRAMUSCULAR; INTRAVENOUS ONCE
Status: COMPLETED | OUTPATIENT
Start: 2020-10-14 | End: 2020-10-14

## 2020-10-14 RX ORDER — PROPOFOL 10 MG/ML
INJECTION, EMULSION INTRAVENOUS PRN
Status: DISCONTINUED | OUTPATIENT
Start: 2020-10-14 | End: 2020-10-14 | Stop reason: SDUPTHER

## 2020-10-14 RX ADMIN — SODIUM CHLORIDE: 9 INJECTION, SOLUTION INTRAVENOUS at 09:58

## 2020-10-14 RX ADMIN — IRON SUCROSE 200 MG: 20 INJECTION, SOLUTION INTRAVENOUS at 09:57

## 2020-10-14 RX ADMIN — LIDOCAINE HYDROCHLORIDE 40 MG: 10 INJECTION, SOLUTION INFILTRATION; PERINEURAL at 13:06

## 2020-10-14 RX ADMIN — DIPHENHYDRAMINE HYDROCHLORIDE 12.5 MG: 50 INJECTION, SOLUTION INTRAMUSCULAR; INTRAVENOUS at 02:50

## 2020-10-14 RX ADMIN — METOPROLOL TARTRATE 25 MG: 25 TABLET ORAL at 09:50

## 2020-10-14 RX ADMIN — SODIUM CHLORIDE, PRESERVATIVE FREE 10 ML: 5 INJECTION INTRAVENOUS at 20:14

## 2020-10-14 RX ADMIN — SODIUM CHLORIDE, PRESERVATIVE FREE 10 ML: 5 INJECTION INTRAVENOUS at 02:50

## 2020-10-14 RX ADMIN — IOPAMIDOL 75 ML: 755 INJECTION, SOLUTION INTRAVENOUS at 12:10

## 2020-10-14 RX ADMIN — METOPROLOL TARTRATE 25 MG: 25 TABLET ORAL at 20:13

## 2020-10-14 RX ADMIN — LEVOTHYROXINE SODIUM 75 MCG: 75 TABLET ORAL at 05:46

## 2020-10-14 RX ADMIN — TRAMADOL HYDROCHLORIDE 100 MG: 50 TABLET, FILM COATED ORAL at 05:42

## 2020-10-14 RX ADMIN — SODIUM CHLORIDE: 9 INJECTION, SOLUTION INTRAVENOUS at 12:57

## 2020-10-14 RX ADMIN — PROPOFOL 60 MG: 10 INJECTION, EMULSION INTRAVENOUS at 13:06

## 2020-10-14 RX ADMIN — ACETAMINOPHEN 650 MG: 325 TABLET ORAL at 03:26

## 2020-10-14 RX ADMIN — HEPARIN SODIUM 5000 UNITS: 5000 INJECTION INTRAVENOUS; SUBCUTANEOUS at 20:13

## 2020-10-14 RX ADMIN — HEPARIN SODIUM 5000 UNITS: 5000 INJECTION INTRAVENOUS; SUBCUTANEOUS at 05:46

## 2020-10-14 ASSESSMENT — PAIN DESCRIPTION - LOCATION: LOCATION: ARM

## 2020-10-14 ASSESSMENT — PAIN SCALES - GENERAL
PAINLEVEL_OUTOF10: 0
PAINLEVEL_OUTOF10: 7
PAINLEVEL_OUTOF10: 8
PAINLEVEL_OUTOF10: 0
PAINLEVEL_OUTOF10: 9

## 2020-10-14 ASSESSMENT — PAIN - FUNCTIONAL ASSESSMENT: PAIN_FUNCTIONAL_ASSESSMENT: 0-10

## 2020-10-14 ASSESSMENT — PAIN DESCRIPTION - ORIENTATION: ORIENTATION: RIGHT;UPPER

## 2020-10-14 ASSESSMENT — PAIN DESCRIPTION - PAIN TYPE: TYPE: ACUTE PAIN

## 2020-10-14 NOTE — PROGRESS NOTES
[facility], [facility]  Occupational Therapy Not Seen Note    DATE: 10/14/2020  Name: Jessie Hallman  : 1940  MRN: [id number]    Patient not available for Occupational Therapy due to:    [] Testing:    [] Hemodialysis    [] Blood Transfusion in Progress    []Refusal by Patient:    [] Surgery/Procedure:    [] Strict Bedrest    [] Sedation    [] Spine Precautions     [] Pt being transferred to palliative care at this time. Spoke with pt/family and OT services to be defered. [] Pt independent with functional mobility and functional tasks. Pt with no OT acute care needs at this time, will defer OT eval.    [x] Other: Pt positive for DVT, await plan of care.     Next Scheduled Treatment: 10/15    [address]

## 2020-10-14 NOTE — ANESTHESIA POSTPROCEDURE EVALUATION
Department of Anesthesiology  Postprocedure Note    Patient: Kiesha Love  MRN: [id number]  YOB: 1940  Date of evaluation: 10/14/2020  Time:  1:55 PM     Procedure Summary     Date:  10/14/20 Room / Location:  [address] [place] [address]    Anesthesia Start:  1301 Anesthesia Stop:  8747    Procedure:  EGD BIOPSY (N/A ) Diagnosis:  (ANEMIA)    Surgeon:  Gina Hart MD Responsible Provider:  Nico Kelly MD    Anesthesia Type:  MAC ASA Status:  4          Anesthesia Type: MAC    Marah Phase I: Marah Score: 9    Marah Phase II: Marah Score: 9    Last vitals: Reviewed and per EMR flowsheets.        Anesthesia Post Evaluation    Patient location during evaluation: PACU  Patient participation: complete - patient participated  Level of consciousness: awake and alert  Pain score: 1  Airway patency: patent  Nausea & Vomiting: no nausea and no vomiting  Complications: no  Cardiovascular status: hemodynamically stable  Respiratory status: acceptable  Hydration status: euvolemic
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
## 2020-10-14 NOTE — PLAN OF CARE
Educated patient on pain scale for assessing level of pain, the need to notify a [facility] provider of episodes of pain, pharmacologic/non-pharmacologic pain management,and potential side effects of prescribed medications. Educate patient on safety precautions and fall prevention measures. Bed/Chair alarms remain on. Falling star in place. Fall sticker on ID band. Non-slip socks on. Clutter free environment. Inspect skin daily. Elevate heels off of the bed at all times. Turn/reposition every 2 hours. Mepilex to coccyx for prevention and assess every shift. Compression therapy to bilateral legs. Educate on how to cough and deep breathe, dyspnea management, and optimal breathing techniques. Give incentive spirometer, instruct how to properly use it, and educate benefits of using. Activity as tolerated. Rise slowly from a sitting or laying position. Educate patient on cardiac monitoring, information regarding decreased cardiac output, energy conservation techniques and activity level. Maintain telemetry and Spo2 monitoring. Check vitals every 4 hrs and PRN.

## 2020-10-14 NOTE — CONSULTS
Today's Date: 10/14/2020  Patient Name: Leda Borrero  Date of admission: 10/13/2020 12:48 PM  Patient's age: 78 y.o., 1940  Admission Dx: Anemia [D64.9]    Reason for Consult: Thrombocytosis and anemia  Requesting Physician: Angelica Flores MD    CHIEF COMPLAINT: b/l leg pain and lightheadedness    History Obtained From:  Patient and chart review    HISTORY OF PRESENT ILLNESS:      The patient is a 78 y.o.  female with past medical history of carotid stenosis s/p left carotid endarterectomy colectomy (2017), essential hypertension, hypothyroidism, osteoporosis and superficial thrombophlebitis lower extremity (was on Coumadin several years ago, not on any anticoagulation currently). who is admitted to the hospital for B/l LE pain and lightheadedness. Pt also mentioned having loss of appetite and weight loss. She was found to have severe fe def anemia with Hb-5.6/MCV 77.6, Fe sat-6, ferritin-9. Platelet 036 . Per smear showed severe reactive thrombocytosis from fe def anemia. No schistocytes or cell abnormality mentioned  Her L/E DVT scan showed new  Right leg acute DVT of gastro-numerous. Pt was taken to OR for EGD in morning which was unremarkable for any acute bleed. Ct abd/pelvis showed no abn lesions. Past Medical History:   has a past medical history of Accident, Arthritis, Back pain, Compression fracture, Dizziness, Heart valve disease, Hx of blood clots, Hyperlipidemia, Hypertension, Osteoporosis, Sprain of lumbar region, Unspecified hypothyroidism, Wears dentures, and Wears glasses. Past Surgical History:   has a past surgical history that includes Appendectomy (1958); Dilation and curettage of uterus (BEFORE 1975); Hysterectomy (1975); and Carotid endarterectomy (Left, 9/25/2017). Medications:    Reviewed in Epic     Allergies:  Sulfa antibiotics    Social History:   reports that she quit smoking about 33 years ago. She has a 16.50 pack-year smoking history.  She has never used smokeless tobacco. She reports that she does not drink alcohol or use drugs. Family History: family history includes Diabetes in her father; Heart Disease in her father; High Blood Pressure in her mother; Stroke in her mother. REVIEW OF SYSTEMS:    Constitutional:loss of appetite and weight loss+  Respiratory: negative for cough , sputum, dyspnea, wheezing, hemoptysis, chest pain   Cardiovascular: negative for chest pain, dyspnea, palpitations, orthopnea, PND   Gastrointestinal: negative for nausea, vomiting, diarrhea, constipation, abdominal pain, Dysphagia, hematemesis and hematochezia   Genitourinary: negative for frequency, dysuria, nocturia, urinary incontinence, and hematuria   Integument: negative for rash, skin lesions, bruises.    Hematologic/Lymphatic: negative for easy bruising, bleeding, lymphadenopathy, or petechiae   Endocrine: negative for heat or cold intolerance,weight changes, change in bowel habits and hair loss   Musculoskeletal: b/l LE pain  Neurological: negative for headaches, dizziness, seizures, weakness, numbness    PHYSICAL EXAM:      BP (!) 150/64   Pulse 89   Temp 98.4 °F (36.9 °C) (Oral)   Resp 18   Ht 5' 2\" (1.575 m)   Wt 111 lb 9.6 oz (50.6 kg)   LMP 1975 (Approximate)   SpO2 98%   BMI 20.41 kg/m²    Temp (24hrs), Av.5 °F (36.9 °C), Min:97.6 °F (36.4 °C), Max:99.1 °F (37.3 °C)    General appearance - well appearing, no in pain or distress    Mouth - mucous membranes moist, pharynx normal without lesions   Neck - supple, no significant adenopathy   Lymphatics - no palpable lymphadenopathy, no hepatosplenomegaly   Chest - clear to auscultation, no wheezes, rales or rhonchi, symmetric air entry   Heart - normal rate, regular rhythm, normal S1, S2, no murmurs  Abdomen - soft, nontender, nondistended, no masses or organomegaly   Neurological - alert, oriented, normal speech, no focal findings or movement disorder noted   Musculoskeletal - no joint tenderness, deformity or probably  3. Acute DVT of Rt leg involving gastrocnemius veins    RECOMMENDATIONS:  1. Reviewed labs and imaging studies. 2. Hb-stable. No active bleeding on EGD. 3. On IV venofer 200 for 5 doses  4. 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 Supportive care per primary team      Discussed with attending physician and Nurse. Thank you for asking us to see this patient. Bayron Rosas MD      Department of Internal Medicine  [id number] [facility]         10/14/2020, 11:25 AM    Attending Physician Statement  The patient was seen and examined during rounds, I have discussed the care of Elli Brown, including pertinent history and exam findings with the resident. I have reviewed the key elements of all parts of the encounter with the resident. I agree with the assessment, and status of the problem list as documented. Additional assessment/ plan  Severe iron def anemia  Start IV iron  Thrombocytosis is likely reactive, ans also related to iron def.    Continue supportive care   Appreciate Gi workup   Pt has weight loss and progressive weakness, concerned for underlying malignancy   We will exclude hemolysis     Katie Joel MD  Hematology Oncology  [phone]  Electronically signed by Matty Perez MD on 10/14/2020 at 8:46 PM
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
## 2020-10-14 NOTE — FLOWSHEET NOTE
Pt had been complaining about right upper arm pain all night. At first, pt was lying on right side on arm so first tried repositioning pt to help the pain. Then at 326am pt given Tylenol 650 mg for continued pain. Finally, tried an ice pain to upper arm which also did not work. Dr. Travis Delgadillo called to bedside at 445am to assess. New orders received.

## 2020-10-14 NOTE — PROGRESS NOTES
Physical Therapy  DATE: 10/14/2020    NAME: Aaliyah Trujillo  MRN: [id number]   : 1940    Patient not seen this date for Physical Therapy due to:  [] Blood transfusion in progress  [] Hemodialysis  []  Patient Declined  [] Spine Precautions   [] Strict Bedrest  [] Surgery/ Procedure  [] Testing      [x] Other- +DVT, await POC. PT will check back as time allows. [] PT being discontinued at this time. Patient independent. No further needs. [] PT being discontinued at this time as the patient has been transferred to palliative care. No further needs.     Nadia Ayers, PT

## 2020-10-14 NOTE — OP NOTE
Operative Note      Patient: Gregoria Amaro  YOB: 1940  MRN: [id number]    Date of Procedure: 10/14/2020    Pre-Op Diagnosis: ANEMIA    Post-Op Diagnosis: Same       Procedure(s):  EGD BIOPSY    Surgeon(s):  Glenna Rodriguez MD    Assistant:   First Assistant: Shahida Gonzalez RN    Anesthesia: Monitor Anesthesia Care    Estimated Blood Loss (mL): Minimal    Complications: None    Specimens:   ID Type Source Tests Collected by Time Destination   A : Thickened Gastric folds Bx Tissue Stomach 194 Sharon, MD 10/14/2020 1309        Implants:  * No implants in log *      Drains: * No LDAs found *    Findings:   1. Normal esophagus  2. Regular Z line noted at 36 cm. 3. 5 cm hiatal hernia. 4. Thickened gastric folds in the antrum. Otherwise the stomach mucosa appeared normal.  Biopsies performed for H. pylori and histology. 5. Retroflexed view of the fundus and cardia appeared normal.  6. Normal duodenum. 7. There was no endoscopic evidence of bleeding or stigmata of bleeding found on today's exam.    Recommendations  1. Await pathology results. 2. Change to oral Protonix once daily  3. Follow an antireflux lifestyle. 4. Agree with IV iron therapy and supplementation. 5. Follow CEA. CT abdomen pelvis was unremarkable for any bowel pathology. 6. Patient will need a colonoscopy, this can be arranged as outpatient. 7. No further GI work up planned as inpatient. Detailed Description of Procedure:   Informed consent was obtained from the patient after explanation of the procedure including indications, description of the procedure,  benefits and possible risks and complications of the procedure, and alternatives. Questions were answered. The patient's history was reviewed and a directed physical examination was performed prior to the procedure. Patient was monitored throughout the procedure with pulse oximetry and periodic assessment of vital signs.  Patient was sedated as noted above. With the patient in the left lateral decubitus position, the Olympus videoendoscope was placed in the patient's mouth and under direct visualization passed into the esophagus. Visualization of the esophagus, stomach, and duodenum was performed during both introduction and withdrawal of the endoscope and retroflexed view of the proximal stomach was obtained. The scope was passed to the 3rd portion of the duodenum. The patient tolerated the procedure well and was taken to the recovery area in good condition. The patient  was taken to the recovery area in good condition.       Electronically signed by Colby Bauman MD on 10/14/2020 at 1:18 PM

## 2020-10-14 NOTE — PLAN OF CARE
Problem: Falls - Risk of:  Goal: Will remain free from falls  Description: Will remain free from falls  Outcome: Ongoing  Goal: Absence of physical injury  Description: Absence of physical injury  Outcome: Ongoing     Problem: Venous Thromboembolism:  Goal: Will show no signs or symptoms of venous thromboembolism  Description: Will show no signs or symptoms of venous thromboembolism  Outcome: Ongoing  Goal: Absence of signs or symptoms of impaired coagulation  Description: Absence of signs or symptoms of impaired coagulation  Outcome: Ongoing     Problem: Pain:  Goal: Pain level will decrease  Description: Pain level will decrease  Outcome: Ongoing  Goal: Control of acute pain  Description: Control of acute pain  Outcome: Ongoing  Goal: Control of chronic pain  Description: Control of chronic pain  Outcome: Ongoing

## 2020-10-14 NOTE — PROGRESS NOTES
This is a 55-year-old female presenting with bilateral leg pain. Associated with lightheadedness. Patient has past medical history of carotid stenosis s/p left carotid endarterectomy colectomy (2017), essential hypertension, hypothyroidism, osteoporosis and superficial thrombophlebitis lower extremity (was on Coumadin several years ago, not on any anticoagulation currently). Patient has loss of appetite and loss of weight. Denies chest pain, dyspnea, syncope, weakness numbness or tingling sensation. Patient denies hematuria, hematemesis, melena or hematochezia. In the ED, patient afebrile hemodynamically stable. Not in acute distress. Saturating well in room air. Pertinent labs: Hemoglobin 5.6, MCV 77.6  Platelet 621  Iron saturation: 6  BNP 2, 268  Troponin 26-16    D-dimer: WNL    FOBT in ED: Negative    Imaging:    DVT scan: Right leg acute DVT of gastro-numerous present. Chest x-ray: Right basilar infiltrate with mild pulmonary edema. Patient mentioned that she had her last colonoscopy several years ago and it was normal.  No records in the chart. Assessment and plan:    Severe iron deficiency anemia: S/p 1 unit PRBC. Every 8  hours H&H. On IV Venofer for 5 days. Follow-up iron studies, B12 folate, peripheral smear, hemolytic panel, FOBT. Transfuse PRBC if hemoglobin<7.  GI consulted to rule out occult GI bleed. Thrombocytosis: Follow-up peripheral smear, procalcitonin, ESR and CRP. Follow-up hematology oncology recommendation. Acute DVT: Hold off anticoagulation as patient's hemoglobin is very low GI bleed needs to be ruled out. Right basilar infiltrate: No previous chest x-ray to compare with. No symptoms of pneumonia. Will assess the need for CT chest for further evaluation of right basilar infiltrate. Chronic constipation: On Colace. Hypothyroidism: TSH WNL. On Synthroid 70 mcg daily    Bilateral pitting pedal edema: Follow-up echo.     Hyperlipidemia:

## 2020-10-14 NOTE — PROGRESS NOTES
[facility]  [facility]  Inpatient Daily Progress Note  ______________________________________________________________________________    Patient: Denisse Easley  YOB: 1940   ZLA:[id number]    Acct: [de-identified]     Room: 5/26-80  Admit date: 10/13/2020  Today's date: 10/14/20  Number of days in the hospital: 1    SUBJECTIVE   Admitting Diagnosis: Anemia  CC: right leg pain  Pt examined at bedside. Chart & results reviewed. Overnight complained of right upper extremity pain ordered upper extremity Duplex  Hemodynamically stable, tachy overnight. No new complaints  Pending heme onch and Gi rec for EGD/ colonoscopy to look for source of bleed  Will monitor for now    ROS:  Constitutional:  negative for chills, fevers, sweats  Respiratory:  negative for cough, dyspnea on exertion, hemoptysis, shortness of breath, wheezing  Cardiovascular:  negative for chest pain, chest pressure/discomfort, lower extremity edema, palpitations  Gastrointestinal:  negative for abdominal pain, constipation, diarrhea, nausea, vomiting  Neurological:  negative for dizziness, headache  BRIEF HISTORY       The patient is a pleasant 78 y.o. female with past medical history of hyperthyroidism, HTN , Urinary incontinence, carotid artery stenosis, Tricuspid valve insufficiency, Mitral regurgitation, chronic back pain, osteoporosis,presents  to the ED with a chief complaint of right leg pain     To begin with pt had right sided leg pain over the last 7 days which is sudden in onset, dull, no aggravating and relieving factors. She denies trauma to legs. Pt also complains of some dizziness and lightheadedness and found to have anemia with hb of 5.6. pt states that she also have constipation and takes colace to relieve constipation. She had noticed some blood in stool in remote past and had dark red stools too.  She denies any acute blood recently in stool and no bleeding.     Review of Systems   Constitutional: Positive for appetite change, fatigue and unexpected weight change. Gastrointestinal: Positive for blood in stool and constipation. Negative . Neurological: Positive for light-headedness.       Initial Vitals on presentation : 154/66, pr 94, rr 18, temp of 99.1     Significant Labs :        Hb of 5.6>5.5 and platelets of 761,144, ferritin 9, probnp 2268, d dimer of 0.30, Troponin of 26>16,     Imaging:      CXR- Interstitial and right basilar infiltrates and small effusion and suspicious small hiatal hernia   Duplex lower limbs - acute DVT of right leg involving gastrocnemius vein and superficial thrombophlebitis of right small sephanous vein in the proximal calf. EKG - sinus rhythm and 1st degree AV block and prolonged QTc of 492  Treatment in ED :  1 prbc transfusion    10/14/2020- pending heme onch and Gi rec will monitor pt for now  OBJECTIVE     Vital Signs:  BP (!) 154/70   Pulse 69   Temp 97.9 °F (36.6 °C) (Oral)   Resp 19   Ht 5' 2\" (1.575 m)   Wt 111 lb 9.6 oz (50.6 kg)   LMP 1975 (Approximate)   SpO2 99%   BMI 20.41 kg/m²     Temp (24hrs), Av.4 °F (36.9 °C), Min:97.6 °F (36.4 °C), Max:99.1 °F (37.3 °C)    In: 350   Out: -     Physical Exam:  Constitutional: This is a well developed, well nourished, 18.5-24.9 - Normal 78y.o. year old female who is alert, oriented, cooperative and in no apparent distress. Head:normocephalic and atraumatic. EENT:  PERRLA. No conjunctival injections. Septum was midline, mucosa was without erythema, exudates or cobblestoning. No thrush was noted. Neck: Supple without thyromegaly. No elevated JVP. Trachea was midline. Respiratory: Chest was symmetrical without dullness to percussion. Breath sounds bilaterally were clear to auscultation. There were no wheezes, rhonchi or rales. There is no intercostal retraction or use of accessory muscles. No egophony noted.    Cardiovascular: Regular without murmur, clicks, gallops or rubs. Abdomen: Slightly rounded and soft without organomegaly. No rebound, rigidity or guarding was appreciated. Lymphatic: No lymphadenopathy. Musculoskeletal: Normal curvature of the spine. No gross muscle weakness. Extremities:  Pain in right lower and upper extemity No lower extremity edema, ulcerations, tenderness, varicosities or erythema. Muscle size, tone and strength are normal.  No involuntary movements are noted. Skin:  Warm and dry. Good color, turgor and pigmentation. No lesions or scars.   No cyanosis or clubbing  Neurological/Psychiatric: The patient's general behavior, level of consciousness, thought content and emotional status is normal.        Medications:  Scheduled Medications:    heparin (porcine)  5,000 Units Subcutaneous 3 times per day    [START ON 10/16/2020] pantoprazole  40 mg Intravenous BID    And    [START ON 10/16/2020] sodium chloride (PF)  10 mL Intravenous BID    metoprolol tartrate  25 mg Oral BID    ascorbic acid  500 mg Oral Daily    calcium carbonate-vitamin D  1 tablet Oral Daily with breakfast    Vitamin D  2,000 Units Oral Daily    levothyroxine  75 mcg Oral Daily    atorvastatin  10 mg Oral Daily    sodium chloride flush  10 mL Intravenous 2 times per day    iron sucrose  200 mg Intravenous Daily    docusate sodium  100 mg Oral Daily     Continuous Infusions:    sodium chloride 30 mL/hr at 10/14/20 0958     PRN Medicationslabetalol, 10 mg, Q6H PRN  sodium chloride flush, 10 mL, PRN  acetaminophen, 650 mg, Q6H PRN    Or  acetaminophen, 650 mg, Q6H PRN  polyethylene glycol, 17 g, Daily PRN  potassium chloride, 40 mEq, PRN    Or  potassium alternative oral replacement, 40 mEq, PRN    Or  potassium chloride, 10 mEq, PRN  magnesium sulfate, 2 g, PRN        Diagnostic Labs:  CBC:   Recent Labs     10/13/20  0834 10/13/20  1314 10/13/20  1714 10/14/20  0536   WBC 9.4 9.2  --  9.9   RBC 2.57* 2.48*  --  3.09*   HGB 5.6* 5. 5* 7.0* 7.2*   HCT 19.9* 19.7* 23.8* 24.9*   MCV 77.4* 79.4*  --  80.6*   RDW 21.5* 21.9*  --  20.0*   * See Reflexed IPF Result  --  973*     BMP:   Recent Labs     10/13/20  0834 10/14/20  0536    139   K 4.1 3.8    105   CO2 25 21   BUN 21 17   CREATININE 0.96* 0.95*     BNP: No results for input(s): BNP in the last 72 hours. PT/INR: No results for input(s): PROTIME, INR in the last 72 hours. APTT:   Recent Labs     10/13/20  1714   APTT 19.9*     CARDIAC ENZYMES: No results for input(s): CKMB, CKMBINDEX, TROPONINI in the last 72 hours. Invalid input(s): CKTOTAL;3  FASTING LIPID PANEL:  Lab Results   Component Value Date    CHOL 176 08/23/2019    HDL 92 08/23/2019    TRIG 48 08/23/2019     LIVER PROFILE:   Recent Labs     10/13/20  0834 10/13/20  1314   AST 29 29   ALT 18 18   BILIDIR  --  <0.08   BILITOT 0.29* 0.22*   ALKPHOS 69 73      MICROBIOLOGY: No results found for: CULTURE    Imaging:    Xr Chest (single View Frontal)    Result Date: 10/13/2020  Mildly prominent interstitial change seen diffusely within the lungs which may represent mild pulmonary edema. There is also a right basilar infiltrate and small effusion which could represent pneumonia. Enlargement of the central silhouette may be related to cardiomegaly, pericardial effusion, as well as possible hiatal hernia. Patient had a small hiatal hernia noted on CT imaging in 2013. ASSESSMENT & PLAN     ASSESSMENT / PLAN:     Principal Problem:    Anemia  Active Problems:    Hypothyroidism    Osteoporosis    Hyperlipidemia    Thrombocytosis (HCC)    History of carotid endarterectomy    Constipation    Severe tricuspid regurgitation    Acute deep vein thrombosis (DVT) of right lower extremity (HCC)  Resolved Problems:    * No resolved hospital problems. *      1. Thrombocytosis   - Follow up on Peripheral smear & reticulocyte count and Immature platelet fraction   - Heme onchology consult      2. Severe Anemia secondary to iron deficiency vs GI bleed  -Post prbc hb of 7.2  -Ganzoni Equation for iron deficieny Deficit  of 1266mg   -Continue  on IV sucrose of 200mg. for 4 more days  -Continue on telemetry  -GI consult for possible source identification with  EGD/Colonoscopy.     3. H/o of carotid artery stenosis and hyperlipidemia  - continue on atorvastatin 10mg, aspirin held for  Suspected bleeding     4. Hypothyroidism  -continue on synthroid 75mcg      5. Osteoporosis  -Vitamin d 2000 units daily, vitamin C and calcium carbonate OD      6.Suspected pneumonia  - CXR infiltrates and small effusions, CRP elevated  - pending procalcitonin, ESR  -will monitor for now      DVT ppx : on heparin  GI ppx: pantoprazole 40mg    PT/OT: ongoing  Discharge Planning / SW: ongoing    Marguerite Boone MD  Internal Medicine Resident, PGY-1  [id number] [facility];  [place], [place]  10/14/2020, 11:52 AM

## 2020-10-15 ENCOUNTER — APPOINTMENT (OUTPATIENT)
Dept: CT IMAGING | Age: 80
DRG: 812 | End: 2020-10-15
Payer: MEDICARE

## 2020-10-15 PROBLEM — M85.80 OSTEOPENIA: Status: ACTIVE | Noted: 2020-10-15

## 2020-10-15 PROBLEM — K44.9 HIATAL HERNIA: Status: ACTIVE | Noted: 2020-10-15

## 2020-10-15 PROBLEM — D50.9 IRON DEFICIENCY ANEMIA: Status: ACTIVE | Noted: 2020-10-15

## 2020-10-15 LAB
ABSOLUTE EOS #: 0.11 K/UL (ref 0–0.44)
ABSOLUTE IMMATURE GRANULOCYTE: 0.11 K/UL (ref 0–0.3)
ABSOLUTE LYMPH #: 1.23 K/UL (ref 1.1–3.7)
ABSOLUTE MONO #: 1.46 K/UL (ref 0.1–1.2)
ANION GAP SERPL CALCULATED.3IONS-SCNC: 11 MMOL/L (ref 9–17)
BASOPHILS # BLD: 1 % (ref 0–2)
BASOPHILS ABSOLUTE: 0.11 K/UL (ref 0–0.2)
BUN BLDV-MCNC: 17 MG/DL (ref 8–23)
BUN/CREAT BLD: ABNORMAL (ref 9–20)
CALCIUM SERPL-MCNC: 8.1 MG/DL (ref 8.6–10.4)
CHLORIDE BLD-SCNC: 108 MMOL/L (ref 98–107)
CO2: 24 MMOL/L (ref 20–31)
CREAT SERPL-MCNC: 0.84 MG/DL (ref 0.5–0.9)
DIFFERENTIAL TYPE: ABNORMAL
EOSINOPHILS RELATIVE PERCENT: 1 % (ref 1–4)
GFR AFRICAN AMERICAN: >60 ML/MIN
GFR NON-AFRICAN AMERICAN: >60 ML/MIN
GFR SERPL CREATININE-BSD FRML MDRD: ABNORMAL ML/MIN/{1.73_M2}
GFR SERPL CREATININE-BSD FRML MDRD: ABNORMAL ML/MIN/{1.73_M2}
GLUCOSE BLD-MCNC: 83 MG/DL (ref 70–99)
HCT VFR BLD CALC: 26.5 % (ref 36.3–47.1)
HCT VFR BLD CALC: 27.2 % (ref 36.3–47.1)
HCT VFR BLD CALC: 27.4 % (ref 36.3–47.1)
HEMOGLOBIN: 7.4 G/DL (ref 11.9–15.1)
HEMOGLOBIN: 7.5 G/DL (ref 11.9–15.1)
HEMOGLOBIN: 7.5 G/DL (ref 11.9–15.1)
IMMATURE GRANULOCYTES: 1 %
LACTATE DEHYDROGENASE: 303 U/L (ref 135–214)
LYMPHOCYTES # BLD: 11 % (ref 24–43)
MCH RBC QN AUTO: 23.4 PG (ref 25.2–33.5)
MCHC RBC AUTO-ENTMCNC: 28.3 G/DL (ref 28.4–34.8)
MCV RBC AUTO: 82.6 FL (ref 82.6–102.9)
MONOCYTES # BLD: 13 % (ref 3–12)
MORPHOLOGY: ABNORMAL
NRBC AUTOMATED: 0.8 PER 100 WBC
PATHOLOGIST REVIEW: NORMAL
PDW BLD-RTO: 21 % (ref 11.8–14.4)
PLATELET # BLD: 892 K/UL (ref 138–453)
PLATELET ESTIMATE: ABNORMAL
PMV BLD AUTO: 10.3 FL (ref 8.1–13.5)
POTASSIUM SERPL-SCNC: 3.9 MMOL/L (ref 3.7–5.3)
RBC # BLD: 3.21 M/UL (ref 3.95–5.11)
RBC # BLD: ABNORMAL 10*6/UL
SEG NEUTROPHILS: 73 % (ref 36–65)
SEGMENTED NEUTROPHILS ABSOLUTE COUNT: 8.18 K/UL (ref 1.5–8.1)
SODIUM BLD-SCNC: 143 MMOL/L (ref 135–144)
SURGICAL PATHOLOGY REPORT: NORMAL
VITAMIN D 25-HYDROXY: 49.5 NG/ML (ref 30–100)
WBC # BLD: 11.2 K/UL (ref 3.5–11.3)
WBC # BLD: ABNORMAL 10*3/UL

## 2020-10-15 PROCEDURE — 6370000000 HC RX 637 (ALT 250 FOR IP): Performed by: INTERNAL MEDICINE

## 2020-10-15 PROCEDURE — 6360000002 HC RX W HCPCS: Performed by: INTERNAL MEDICINE

## 2020-10-15 PROCEDURE — 6360000004 HC RX CONTRAST MEDICATION: Performed by: STUDENT IN AN ORGANIZED HEALTH CARE EDUCATION/TRAINING PROGRAM

## 2020-10-15 PROCEDURE — 80048 BASIC METABOLIC PNL TOTAL CA: CPT

## 2020-10-15 PROCEDURE — 85018 HEMOGLOBIN: CPT

## 2020-10-15 PROCEDURE — 6370000000 HC RX 637 (ALT 250 FOR IP): Performed by: STUDENT IN AN ORGANIZED HEALTH CARE EDUCATION/TRAINING PROGRAM

## 2020-10-15 PROCEDURE — 74174 CTA ABD&PLVS W/CONTRAST: CPT

## 2020-10-15 PROCEDURE — 85014 HEMATOCRIT: CPT

## 2020-10-15 PROCEDURE — 2060000000 HC ICU INTERMEDIATE R&B

## 2020-10-15 PROCEDURE — 2580000003 HC RX 258: Performed by: INTERNAL MEDICINE

## 2020-10-15 PROCEDURE — 99232 SBSQ HOSP IP/OBS MODERATE 35: CPT | Performed by: INTERNAL MEDICINE

## 2020-10-15 PROCEDURE — 97530 THERAPEUTIC ACTIVITIES: CPT

## 2020-10-15 PROCEDURE — 36415 COLL VENOUS BLD VENIPUNCTURE: CPT

## 2020-10-15 PROCEDURE — 97166 OT EVAL MOD COMPLEX 45 MIN: CPT

## 2020-10-15 PROCEDURE — 97535 SELF CARE MNGMENT TRAINING: CPT

## 2020-10-15 PROCEDURE — 85025 COMPLETE CBC W/AUTO DIFF WBC: CPT

## 2020-10-15 PROCEDURE — 97162 PT EVAL MOD COMPLEX 30 MIN: CPT

## 2020-10-15 PROCEDURE — 82306 VITAMIN D 25 HYDROXY: CPT

## 2020-10-15 RX ORDER — TRAMADOL HYDROCHLORIDE 50 MG/1
25 TABLET ORAL ONCE
Status: COMPLETED | OUTPATIENT
Start: 2020-10-15 | End: 2020-10-15

## 2020-10-15 RX ORDER — POLYETHYLENE GLYCOL 3350 17 G/17G
17 POWDER, FOR SOLUTION ORAL ONCE
Status: COMPLETED | OUTPATIENT
Start: 2020-10-15 | End: 2020-10-15

## 2020-10-15 RX ADMIN — OXYCODONE HYDROCHLORIDE AND ACETAMINOPHEN 500 MG: 500 TABLET ORAL at 08:38

## 2020-10-15 RX ADMIN — IOPAMIDOL 100 ML: 755 INJECTION, SOLUTION INTRAVENOUS at 12:52

## 2020-10-15 RX ADMIN — SODIUM CHLORIDE, PRESERVATIVE FREE 10 ML: 5 INJECTION INTRAVENOUS at 08:38

## 2020-10-15 RX ADMIN — HEPARIN SODIUM 5000 UNITS: 5000 INJECTION INTRAVENOUS; SUBCUTANEOUS at 05:37

## 2020-10-15 RX ADMIN — Medication 1 TABLET: at 08:37

## 2020-10-15 RX ADMIN — Medication 2000 UNITS: at 08:38

## 2020-10-15 RX ADMIN — POLYETHYLENE GLYCOL 3350 17 G: 17 POWDER, FOR SOLUTION ORAL at 15:29

## 2020-10-15 RX ADMIN — DOCUSATE SODIUM 100 MG: 100 CAPSULE, LIQUID FILLED ORAL at 08:37

## 2020-10-15 RX ADMIN — TRAMADOL HYDROCHLORIDE 25 MG: 50 TABLET, FILM COATED ORAL at 17:50

## 2020-10-15 RX ADMIN — PANTOPRAZOLE SODIUM 40 MG: 40 TABLET, DELAYED RELEASE ORAL at 05:37

## 2020-10-15 RX ADMIN — IRON SUCROSE 200 MG: 20 INJECTION, SOLUTION INTRAVENOUS at 08:39

## 2020-10-15 RX ADMIN — HEPARIN SODIUM 5000 UNITS: 5000 INJECTION INTRAVENOUS; SUBCUTANEOUS at 15:29

## 2020-10-15 RX ADMIN — ATORVASTATIN CALCIUM 10 MG: 10 TABLET, FILM COATED ORAL at 08:39

## 2020-10-15 RX ADMIN — METOPROLOL TARTRATE 25 MG: 25 TABLET ORAL at 21:49

## 2020-10-15 RX ADMIN — TRAMADOL HYDROCHLORIDE 25 MG: 50 TABLET, FILM COATED ORAL at 22:29

## 2020-10-15 RX ADMIN — METOPROLOL TARTRATE 25 MG: 25 TABLET ORAL at 08:39

## 2020-10-15 RX ADMIN — LEVOTHYROXINE SODIUM 75 MCG: 75 TABLET ORAL at 05:37

## 2020-10-15 RX ADMIN — SODIUM CHLORIDE, PRESERVATIVE FREE 10 ML: 5 INJECTION INTRAVENOUS at 21:49

## 2020-10-15 RX ADMIN — HEPARIN SODIUM 5000 UNITS: 5000 INJECTION INTRAVENOUS; SUBCUTANEOUS at 21:49

## 2020-10-15 ASSESSMENT — PAIN DESCRIPTION - DESCRIPTORS
DESCRIPTORS: ACHING
DESCRIPTORS: SHARP;ACHING

## 2020-10-15 ASSESSMENT — PAIN SCALES - GENERAL
PAINLEVEL_OUTOF10: 10
PAINLEVEL_OUTOF10: 7
PAINLEVEL_OUTOF10: 10
PAINLEVEL_OUTOF10: 0
PAINLEVEL_OUTOF10: 10
PAINLEVEL_OUTOF10: 0
PAINLEVEL_OUTOF10: 0

## 2020-10-15 ASSESSMENT — PAIN DESCRIPTION - PAIN TYPE
TYPE: CHRONIC PAIN

## 2020-10-15 ASSESSMENT — PAIN DESCRIPTION - LOCATION
LOCATION: BACK

## 2020-10-15 ASSESSMENT — PAIN DESCRIPTION - ONSET
ONSET: SUDDEN
ONSET: PROGRESSIVE

## 2020-10-15 ASSESSMENT — PAIN DESCRIPTION - ORIENTATION
ORIENTATION: MID
ORIENTATION: LOWER
ORIENTATION: MID

## 2020-10-15 ASSESSMENT — PAIN DESCRIPTION - FREQUENCY
FREQUENCY: INTERMITTENT
FREQUENCY: INTERMITTENT

## 2020-10-15 ASSESSMENT — PAIN - FUNCTIONAL ASSESSMENT
PAIN_FUNCTIONAL_ASSESSMENT: PREVENTS OR INTERFERES SOME ACTIVE ACTIVITIES AND ADLS
PAIN_FUNCTIONAL_ASSESSMENT: PREVENTS OR INTERFERES WITH MANY ACTIVE NOT PASSIVE ACTIVITIES

## 2020-10-15 NOTE — PLAN OF CARE
Problem: Falls - Risk of:  Goal: Will remain free from falls  Description: Will remain free from falls  Outcome: Met This Shift  Goal: Absence of physical injury  Description: Absence of physical injury  Outcome: Met This Shift     Problem: Venous Thromboembolism:  Goal: Will show no signs or symptoms of venous thromboembolism  Description: Will show no signs or symptoms of venous thromboembolism  Outcome: Met This Shift     Problem: Pain:  Description: Pain management should include both nonpharmacologic and pharmacologic interventions. Goal: Control of acute pain  Description: Control of acute pain  Outcome: Met This Shift     Problem: Venous Thromboembolism:  Goal: Absence of signs or symptoms of impaired coagulation  Description: Absence of signs or symptoms of impaired coagulation  Outcome: Ongoing     Problem: Pain:  Description: Pain management should include both nonpharmacologic and pharmacologic interventions.   Goal: Pain level will decrease  Description: Pain level will decrease  Outcome: Ongoing  Goal: Control of chronic pain  Description: Control of chronic pain  Outcome: Ongoing

## 2020-10-15 NOTE — PROGRESS NOTES
Physician Progress Note      Randal Pascal  [facility] #:                  [id number]  :                       1940  ADMIT DATE:       10/13/2020 12:48 PM  [facility] DATE:  RESPONDING  PROVIDER #:        Deepali Clayton MD          QUERY TEXT:    Patient admitted with osteoporosis, noted to have osteopenia and multilevel   compression fractures - new since  on Ct abd of 10/14. If possible, please   document in progress notes and discharge summary if you are evaluating and/or   treating any of the following: The medical record reflects the following:  Risk Factors: age, anemia,, anorexia, abnormal weight loss  Clinical Indicators: CT abd pelvis - 10/14 - Osteopenia. Multilevel   compression fractures in the lower thoracic and  lower lumbar spine, many of which appear new since . Treatment: CT abd/ pelvis, Calcium, Vitamin C and D, potassium supplements. If you are in need of further assistance, please reach out to  me.   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

## 2020-10-15 NOTE — PROGRESS NOTES
[facility]  Internal Medicine Teaching Residency Program  Inpatient Daily Progress Note  ______________________________________________________________________________    Patient: Leda Borrero  YOB: 1940   CHANEY:[id number]    Acct: [de-identified]     Room: 5/26-80  Admit date: 10/13/2020  Today's date: 10/15/20  Number of days in the hospital: 2    SUBJECTIVE   Admitting Diagnosis: Iron deficiency anemia  CC: right leg pain  Pt examined at bedside. Chart & results reviewed. Hemodynamically stable. No new complaints. Labs : ECHO showed Ejection fraction of 24%, grade 2 diastolic dysfunction, CEA negative, EGD did not show any source of bleed, GI on board plans for outpatient colonoscopy    The CT angiography abdomen showed moderate to severe aortic vascular calcifications severe plaque identified at origin of celiac artery resulting IN 80% narrowing at the ostium. Heavy plaque at the origin of superior mesenteric artery. Heavy plaque identified at the origin of bilateral renal arteries. With narrowing of right proximal renal artery and 50 to 70% stenosis of left proximal renal artery. Cholelithiasis, bilateral renal parenchymal bands identified worrisome for multifocal infarcts versus pyelonephritis. Large amount of stool retained at rectosigmoid junction. Duplex Right upper limb is negative for clot. Heme-onc  Will monitor for now.     ROS:  Constitutional:  negative for chills, fevers, sweats  Respiratory:  negative for cough, dyspnea on exertion, hemoptysis, shortness of breath, wheezing  Cardiovascular:  negative for chest pain, chest pressure/discomfort, lower extremity edema, palpitations  Gastrointestinal:  negative for abdominal pain, constipation, diarrhea, nausea, vomiting  Neurological:  negative for dizziness, headache  BRIEF HISTORY       The patient is a pleasant 78 y.o. female with past medical history of hyperthyroidism, HTN , Urinary incontinence, carotid artery stenosis, Tricuspid valve insufficiency, Mitral regurgitation, chronic back pain, osteoporosis,presents  to the ED with a chief complaint of right leg pain     To begin with pt had right sided leg pain over the last 7 days which is sudden in onset, dull, no aggravating and relieving factors. She denies trauma to legs. Pt also complains of some dizziness and lightheadedness and found to have anemia with hb of 5.6. pt states that she also have constipation and takes colace to relieve constipation. She had noticed some blood in stool in remote past and had dark red stools too. She denies any acute blood recently in stool and no bleeding.     Review of Systems   Constitutional: Positive for appetite change, fatigue and unexpected weight change. Gastrointestinal: Positive for blood in stool and constipation. Negative . Neurological: Positive for light-headedness.       Initial Vitals on presentation : 154/66, pr 94, rr 18, temp of 99.1     Significant Labs :        Hb of 5.6>5.5 and platelets of 330,804, ferritin 9, probnp 2268, d dimer of 0.30, Troponin of 26>16,     Imaging:   CXR- Interstitial and right basilar infiltrates and small effusion and suspicious small hiatal hernia   Duplex lower limbs - acute DVT of right leg involving gastrocnemius vein and superficial thrombophlebitis of right small sephanous vein in the proximal calf. EKG - sinus rhythm and 1st degree AV block and prolonged QTc of 492  Treatment in ED :  1 prbc transfusion    10/14/2020- pending heme onch and Gi rec will monitor pt for now  10/15/2020-CT angiography abdomen, patient colonoscopy, pending recommendations for anticoagulation.    OBJECTIVE     Vital Signs:  BP (!) 140/64   Pulse 81   Temp 97.9 °F (36.6 °C) (Oral)   Resp 16   Ht 5' 2\" (1.575 m)   Wt 110 lb 8 oz (50.1 kg)   LMP 1975 (Approximate)   SpO2 92%   BMI 20.21 kg/m²     Temp (24hrs), Av.9 °F (36.6 °C), Min:96.4 °F (35.8 °C), Max:98.6 °F (37 °C)    In: 20   Out: -     Physical Exam:  Constitutional: This is a well developed, well nourished, 18.5-24.9 - Normal 78y.o. year old female who is alert, oriented, cooperative and in no apparent distress. Head:normocephalic and atraumatic. EENT:  PERRLA. No conjunctival injections. Septum was midline, mucosa was without erythema, exudates or cobblestoning. No thrush was noted. Neck: Supple without thyromegaly. No elevated JVP. Trachea was midline. Respiratory: Chest was symmetrical without dullness to percussion. Breath sounds bilaterally were clear to auscultation. There were no wheezes, rhonchi or rales. There is no intercostal retraction or use of accessory muscles. No egophony noted. Cardiovascular: Regular without murmur, clicks, gallops or rubs. Abdomen: Slightly rounded and soft without organomegaly. No rebound, rigidity or guarding was appreciated. Lymphatic: No lymphadenopathy. Musculoskeletal: Normal curvature of the spine. No gross muscle weakness. Extremities:  Pain in right lower and upper extemity No lower extremity edema, ulcerations, tenderness, varicosities or erythema. Muscle size, tone and strength are normal.  No involuntary movements are noted. Skin:  Warm and dry. Good color, turgor and pigmentation. No lesions or scars.   No cyanosis or clubbing  Neurological/Psychiatric: The patient's general behavior, level of consciousness, thought content and emotional status is normal.        Medications:  Scheduled Medications:    heparin (porcine)  5,000 Units Subcutaneous 3 times per day    [START ON 10/16/2020] sodium chloride (PF)  10 mL Intravenous BID    metoprolol tartrate  25 mg Oral BID    pantoprazole  40 mg Oral QAM AC    ascorbic acid  500 mg Oral Daily    calcium carbonate-vitamin D  1 tablet Oral Daily with breakfast    Vitamin D  2,000 Units Oral Daily    levothyroxine  75 mcg Oral Daily    atorvastatin  10 mg Oral Daily    sodium chloride flush  10 mL Intravenous 2 times per day    iron sucrose  200 mg Intravenous Daily    docusate sodium  100 mg Oral Daily     Continuous Infusions:     PRN Medicationslabetalol, 10 mg, Q6H PRN  sodium chloride flush, 10 mL, PRN  acetaminophen, 650 mg, Q6H PRN    Or  acetaminophen, 650 mg, Q6H PRN  polyethylene glycol, 17 g, Daily PRN  potassium chloride, 40 mEq, PRN    Or  potassium alternative oral replacement, 40 mEq, PRN    Or  potassium chloride, 10 mEq, PRN  magnesium sulfate, 2 g, PRN        Diagnostic Labs:  CBC:   Recent Labs     10/13/20  1314  10/14/20  0536 10/14/20  2337 10/15/20  0547   WBC 9.2  --  9.9  --  11.2   RBC 2.48*  --  3.09*  --  3.21*   HGB 5.5*   < > 7.2* 7.5* 7.5*   HCT 19.7*   < > 24.9* 27.2* 26.5*   MCV 79.4*  --  80.6*  --  82.6   RDW 21.9*  --  20.0*  --  21.0*   PLT See Reflexed IPF Result  --  973*  --  892*    < > = values in this interval not displayed. BMP:   Recent Labs     10/13/20  0834 10/14/20  0536 10/15/20  0547    139 143   K 4.1 3.8 3.9    105 108*   CO2 25 21 24   BUN 21 17 17   CREATININE 0.96* 0.95* 0.84     BNP: No results for input(s): BNP in the last 72 hours. PT/INR: No results for input(s): PROTIME, INR in the last 72 hours. APTT:   Recent Labs     10/13/20  1714   APTT 19.9*     CARDIAC ENZYMES: No results for input(s): CKMB, CKMBINDEX, TROPONINI in the last 72 hours. Invalid input(s): CKTOTAL;3  FASTING LIPID PANEL:  Lab Results   Component Value Date    CHOL 176 08/23/2019    HDL 92 08/23/2019    TRIG 48 08/23/2019     LIVER PROFILE:   Recent Labs     10/13/20  0834 10/13/20  1314   AST 29 29   ALT 18 18   BILIDIR  --  <0.08   BILITOT 0.29* 0.22*   ALKPHOS 69 73      MICROBIOLOGY: No results found for: CULTURE    Imaging:    Xr Chest (single View Frontal)    Result Date: 10/13/2020  Mildly prominent interstitial change seen diffusely within the lungs which may represent mild pulmonary edema.   There is also a right basilar infiltrate and small effusion which could represent pneumonia. Enlargement of the central silhouette may be related to cardiomegaly, pericardial effusion, as well as possible hiatal hernia. Patient had a small hiatal hernia noted on CT imaging in 2013. ASSESSMENT & PLAN     ASSESSMENT / PLAN:     Principal Problem:    Iron deficiency anemia  Active Problems:    Hypothyroidism    Hyperlipidemia    Thrombocytosis (HCC)    History of carotid endarterectomy    Constipation    Severe tricuspid regurgitation    Acute deep vein thrombosis (DVT) of right lower extremity (HCC)    Hiatal hernia  Resolved Problems:    * No resolved hospital problems. *      1. DVT of right leg   - Pending Heme onchology rec  - would like to start elliquis .     2. Severe Anemia secondary to iron deficiency vs GI bleed  -Post prbc hb of 7.2  -Ganzoni Equation for iron deficieny Deficit  of 1266mg   -Continue  on IV sucrose of 200mg   -Continue on telemetry  -GI consult EGD negative and op colonoscopy.     3. H/o of carotid artery stenosis and hyperlipidemia  - continue on atorvastatin 10mg, will resume aspirin     4. Hypothyroidism  -continue on synthroid 75mcg      5. Osteopenia   -Vitamin d 2000 units daily, vitamin C and calcium carbonate OD      6.Suspected pneumonia  - CXR infiltrates and small effusions, CRP elevated  - pending procalcitonin, ESR  -will monitor for now.     DVT ppx : on heparin  GI ppx: pantoprazole 40mg oral    PT/OT: ongoing  Discharge Planning / SW: ongoing    Sugey Peña MD  Internal Medicine Resident, PGY-1  [facility];  [place], [place]  10/15/2020, 10:23 AM

## 2020-10-15 NOTE — PROGRESS NOTES
Occupational Therapy   Occupational Therapy Initial Assessment  Date: 10/15/2020   Patient Name: Raz Shafer  MRN: [id number]     : 1940    Date of Service: 10/15/2020    Discharge Recommendations:       Further therapy recommended at discharge. Assessment   Performance deficits / Impairments: Decreased functional mobility ; Decreased ADL status; Decreased endurance;Decreased high-level IADLs;Decreased balance  Assessment: Pt performed transfers at [facility], and grooming tasks in standing sinkside at SBA, d/t endurance and balance concerns. Endurance and balance limitations decrease pt's participation in functional mobility/transfers, ADLs, and IADLs. OT is warranted to increase endurance, and educate pt on EC/WS techniques and safe functional mobility/transfer techniques. Prognosis: Good  Decision Making: Medium Complexity  Patient Education: Pt educated on OT role, purpose of OT eval, and hand placement for safe transfer techniques, and use of RW for safety during funcitonal mobility. Safety Devices  Safety Devices in place: Yes  Type of devices: Call light within reach;Gait belt;Left in chair;Chair alarm in place           Patient Diagnosis(es): The primary encounter diagnosis was DVT (deep vein thrombosis) in pregnancy. A diagnosis of Anemia, unspecified type was also pertinent to this visit. has a past medical history of Accident, Arthritis, Back pain, Compression fracture, Dizziness, Heart valve disease, Hx of blood clots, Hyperlipidemia, Hypertension, Osteoporosis, Sprain of lumbar region, Unspecified hypothyroidism, Wears dentures, and Wears glasses. has a past surgical history that includes Appendectomy (); Dilation and curettage of uterus (BEFORE ); Hysterectomy (); Carotid endarterectomy (Left, 2017); and Upper gastrointestinal endoscopy (N/A, 10/14/2020).            Restrictions  Restrictions/Precautions  Restrictions/Precautions: General Precautions, Fall Risk  Required Braces or Orthoses?: No  Position Activity Restriction  Other position/activity restrictions: Up with assist    Subjective   General  Patient assessed for rehabilitation services?: Yes  Family / Caregiver Present: No  General Comment  Comments: NS ok's pt for OT eval.  Pt pleasant and cooperative throughout session. Patient Currently in Pain: No  Pain Assessment  Pain Assessment: 0-10  Pain Level: 0    Social/Functional History  Social/Functional History  Lives With: Spouse  Type of Home: House  Home Layout: One level  Home Access: Stairs to enter with rails  Entrance Stairs - Number of Steps: 3  Entrance Stairs - Rails: Right  Bathroom Shower/Tub: Tub/Shower unit  Bathroom Toilet: Standard(Pt reports having raiser with rail on right side)  Bathroom Equipment: Shower chair  Home Equipment: 4 wheeled walker  Receives Help From: Family(daughters live out of town but temporarily staying with pt to help out)  ADL Assistance: [address]: Independent  Homemaking Responsibilities: Yes  Ambulation Assistance: Independent  Transfer Assistance: Independent  Active : Yes  Mode of Transportation: [employer]  Occupation: Retired  Type of occupation: worked for [address] [place] [address] Avenue: [employer]  Additional Comments: Pt reports  needs assistance - unclear of assistance provided to       Objective   Vision: Impaired  Vision Exceptions: Wears glasses for distance(wears glasses for driving)  Hearing: Within functional limits       Balance  Sitting Balance: Supervision  Seated supported in bedside recliner.  Pt had 2L 02 in room but nursing took off O2 at the start of the session Standing Balance: SBA  Standing Balance  Time: ~8 min  Activity: sinkside ADLs  Functional Mobility - Mobility Device: Rolling Walker  Activity: To/from bathroom   Assist Level: CGA   Functional Mobility Comments: VC for safe walker placement   Toilet Transfers  Toilet - Technique: Ambulating  Equipment Used: Grab bars: Pt transfered to/from standard toilet using GB on R side (to simulate home environment) and RW at [facility]. Toilet Transfer: Contact guard assistance  ADL  Feeding: Independent  Grooming: Stand by assistance(Pt performed oral care and washed face standing sinkside)  UE Bathing: Stand by assistance  LE Bathing: Contact guard assistance  UE Dressing: Stand by assistance  LE Dressing: Contact guard assistance(Pt doffed and donned socks independently while seated in bedside recliner)  Toileting: Contact guard assistance        Bed mobility  Comment: Pt in bedside recliner upon arrival, retired to recliner at the end of session.         Cognition  Overall Cognitive Status: WFL        Sensation  Overall Sensation Status: WFL                        Plan   Plan  Times per week: 3x/wk  Current Treatment Recommendations: Endurance Training, Functional Mobility Training, Balance Training, Safety Education & Training, Self-Care / ADL, Equipment Evaluation, Education, & procurement, Patient/Caregiver Education & Training       AM-PAC Score        AM-PAC Inpatient Daily Activity Raw Score: 18 (10/15/20 1101)  AM-PAC Inpatient ADL T-Scale Score : 38.66 (10/15/20 1101)  ADL Inpatient CMS 0-100% Score: 46.65 (10/15/20 1101)  ADL Inpatient CMS G-Code Modifier : CK (10/15/20 1101)    Goals  Short term goals  Time Frame for Short term goals: By discharge, pt will  Short term goal 1: perform UB ADLs independently  Short term goal 2: perform LB ADLs at supervision  Short term goal 3: perform 20+ min functional activity using EC/WS PRN for increased participation in ADLs  Short term goal 4: demo safe transfer/mobility techniques using AD PRN at supervision for increased safety and participation in ADLs  Short term goal 5: demo 10+ min dynamic standing balance at SBA for increased participation in ADLs       Therapy Time   Individual Concurrent Group Co-treatment   Time In 0834         Time Out 0918         Minutes 44         Timed Code Treatment Minutes: Fady

## 2020-10-15 NOTE — PROGRESS NOTES
Physical Therapy    Facility/Department: [facility] 4A STEPDOWN  Initial Assessment    NAME: Landen Vega  : 1940  MRN: [id number]  Chief Complaint   Patient presents with    Leg Pain     Pt arrives via medics from [place], c/o bilateral leg pain, dx DVT rt leg       Date of Service: 10/15/2020    Discharge Recommendations:    Further therapy recommended at discharge. Pt is a fall risk and requires assistance for safe mobility based on pt presentation this date. PT Equipment Recommendations  Equipment Needed: Yes(Pt reports having 4WW, cane)  Mobility Devices: Nath Hair: Rolling    Assessment   Body structures, Functions, Activity limitations: Decreased functional mobility ; Decreased strength;Decreased endurance;Decreased balance;Decreased posture;Decreased coordination  Assessment: Pt ambulated 30 ft w RW and CGA. Pt stood ~8 min w fair + balance. Pt would benefit from continued PT services to address balance, endurance and functional deficits. Prognosis: Good  Decision Making: Medium Complexity  PT Education: Goals;PT Role;Plan of Care;Gait Training;General Safety;Transfer Training  Barriers to Learning: none  REQUIRES PT FOLLOW UP: Yes  Activity Tolerance  Activity Tolerance: Patient limited by fatigue;Patient limited by endurance       Patient Diagnosis(es): The primary encounter diagnosis was DVT (deep vein thrombosis) in pregnancy. A diagnosis of Anemia, unspecified type was also pertinent to this visit. has a past medical history of Accident, Arthritis, Back pain, Compression fracture, Dizziness, Heart valve disease, Hx of blood clots, Hyperlipidemia, Hypertension, Osteoporosis, Sprain of lumbar region, Unspecified hypothyroidism, Wears dentures, and Wears glasses. has a past surgical history that includes Appendectomy (); Dilation and curettage of uterus (BEFORE ); Hysterectomy ();  Carotid endarterectomy (Left, 2017); and Upper gastrointestinal endoscopy (N/A, 10/14/2020). Restrictions  Restrictions/Precautions  Restrictions/Precautions: General Precautions, Fall Risk  Required Braces or Orthoses?: No  Position Activity Restriction  Other position/activity restrictions: Up with assist  Vision/Hearing  Vision: Impaired  Vision Exceptions: Wears glasses at all times(wears glasses for driving, TV, reading)  Hearing: Within functional limits     Subjective  General  Patient assessed for rehabilitation services?: Yes  Family / Caregiver Present: No  Follows Commands: Within Functional Limits  Subjective  Subjective: RN and pt in agreement to eval/ treat. Pt seated in bedside recliner with 2L O2 NC upon arrival. NC removed by RN prior to mobility. Pt pleasant and cooperative throughout.   Pain Screening  Patient Currently in Pain: No  Pain Assessment  Pain Assessment: 0-10  Pain Level: 0  Vital Signs  Patient Currently in Pain: No       Orientation  Orientation  Overall Orientation Status: Within Functional Limits  Social/Functional History  Social/Functional History  Lives With: Spouse  Type of Home: House  Home Layout: One level  Home Access: Stairs to enter with rails  Entrance Stairs - Number of Steps: 3  Entrance Stairs - Rails: Right  Bathroom Shower/Tub: Tub/Shower unit  Bathroom Toilet: Standard(Pt reports having raiser with rail on right side)  Bathroom Equipment: Shower chair  Home Equipment: 4 wheeled walker  Receives Help From: Family(daughters live out of town but temporarily staying with pt to help out)  ADL Assistance: [address]: Independent  Homemaking Responsibilities: Yes  Ambulation Assistance: Independent  Transfer Assistance: Independent  Active : Yes  Mode of Transportation: [employer]  Occupation: Retired  Type of occupation: worked for [employer]: [employer]  Additional Comments: Pt reports  needs assistance  Cognition   Cognition  Overall Cognitive Status: WFL    Objective     Observation/Palpation  Posture: Poor(Significant thoracic kyphosis, diminished lumbar lordosis)    Joint Mobility  ROM RLE: WFL  ROM LLE: WFL  ROM RUE: WFL, refer OT note  ROM LUE: WFL, refer OT note  Strength RLE  Strength RLE: Exception  R Hip Flexion: 4-/5  R Knee Flexion: 4/5  R Knee Extension: 4/5  R Ankle Dorsiflexion: 4+/5  R Ankle Plantar flexion: 4+/5  Strength LLE  Strength LLE: Exception  L Hip Flexion: 4-/5  L Knee Flexion: 4/5  L Knee Extension: 4/5  L Ankle Dorsiflexion: 4+/5  L Ankle Plantar Flexion: 4+/5  Strength RUE  Strength RUE: WFL  Comment: Refer OT note  Strength LUE  Strength LUE: WFL  Comment: Refer OT note  Tone RLE  RLE Tone: Normotonic  Tone LLE  LLE Tone: Normotonic  Motor Control  Gross Motor?: WFL  Sensation  Overall Sensation Status: WFL  Bed mobility  Scooting: Stand by assistance(To edge of chair)  Comment: Pt in chair upon arrival, returning to chair immediately following mobility  Transfers  Sit to Stand: Contact guard assistance  Stand to sit: Contact guard assistance  Comment: Performed w/o AD. Pt verbally cued for hand placement. Pt requests RW for ambulation. Ambulation  Ambulation?: Yes  More Ambulation?: No  Ambulation 1  Surface: level tile  Device: Rolling Walker  Assistance: Contact guard assistance  Quality of Gait: L foot out-toeing > R.  Gait Deviations: Decreased step length;Decreased step height  Distance: 30 ft  Comments: Pt denies dizziness or buckling. Pt reports fatigue following washing face in restroom. Pt cued to maintain feet within walker with poor return. Stairs/Curb  Stairs?: No(Not assessed d/t pt fatigue)     Balance  Posture: Poor  Sitting - Static: Good  Sitting - Dynamic: Good;-  Standing - Static: Fair;+  Standing - Dynamic: Fair  Comments: Standing balance assessed w RW. No LOB noted. Pt stood to wash face/ brush teeth ~ 8 min. Pt often used one UE for support on sink.         Plan   Plan  Times per week: 5-6x/wk  Times per day: Daily  Current Treatment Recommendations: Strengthening, Balance Training, Functional Mobility Training, Transfer Training, Home Exercise Program, Safety Education & Training, Patient/Caregiver Education & Training, Stair training, Gait Training, Endurance Training  Safety Devices  Type of devices: Chair alarm in place, Left in chair, All fall risk precautions in place, Call light within reach, Nurse notified, Gait belt  Restraints  Initially in place: No      AM-PAC Score  AM-PAC Inpatient Mobility Raw Score : 17 (10/15/20 1110)  AM-PAC Inpatient T-Scale Score : 42.13 (10/15/20 1110)  Mobility Inpatient CMS 0-100% Score: 50.57 (10/15/20 1110)  Mobility Inpatient CMS G-Code Modifier : CK (10/15/20 1110)          Goals  Short term goals  Time Frame for Short term goals: 14 visits  Short term goal 1: Pt to perform functional transfers and bed mobility with independence  Short term goal 2: Pt to ambulate 200 ft w proper use of least restrictive device and supervision. Short term goal 3: Pt to demo good- standing dynamic balance to reduce risk of falls  Short term goal 4: Pt to tolerate 30 min of PT to dmeo improved endurance  Short term goal 5: Pt to negotiate 3 stairs w use of R HR to simulate home environment       Therapy Time   Individual Concurrent Group Co-treatment   Time In 0834         Time Out 0918         Minutes 44         Timed Code Treatment Minutes: [facility]     Evaluation/treatment performed by Student PT under the supervision of co-signing PT who agrees with all evaluation/treatment and documentation.

## 2020-10-15 NOTE — PROGRESS NOTES
Progress Note    Today's Date: 10/15/2020  Patient Name: Aaliyah Trujillo  Date of admission: 10/13/2020 12:48 PM  Patient's age: 78 y.o., 1940  Admission Dx: Anemia [D64.9]    Reason for Consult: Thrombocytosis and anemia  Requesting Physician: Carlos Blum MD    CHIEF COMPLAINT: b/l leg pain and lightheadedness    History Obtained From:  Patient and chart review    Interval History 10/15/2020  Patient seen and examined at bedside. Resting confortably in no acute distress. Right upper arm pain overnight, follow-up venous duplex. Vitals reviewed. Afebrile, hemodynamically stable. Labs reviewed. Platelets down to 919. Hemoglobin 7.5 . EGD performed yesterday, negative for any bleeding. Will need outpatient colonoscopy. Remains on IV Venofer. HISTORY OF PRESENT ILLNESS:      The patient is a 78 y.o.  female with past medical history of carotid stenosis s/p left carotid endarterectomy colectomy (2017), essential hypertension, hypothyroidism, osteoporosis and superficial thrombophlebitis lower extremity (was on Coumadin several years ago, not on any anticoagulation currently). who is admitted to the hospital for B/l LE pain and lightheadedness. Pt also mentioned having loss of appetite and weight loss. She was found to have severe fe def anemia with Hb-5.6/MCV 77.6, Fe sat-6, ferritin-9. Platelet 194 . Per smear showed severe reactive thrombocytosis from fe def anemia. No schistocytes or cell abnormality mentioned  Her L/E DVT scan showed new  Right leg acute DVT of gastro-numerous. Pt was taken to OR for EGD in morning which was unremarkable for any acute bleed. Ct abd/pelvis showed no abn lesions. Past Medical History:   has a past medical history of Accident, Arthritis, Back pain, Compression fracture, Dizziness, Heart valve disease, Hx of blood clots, Hyperlipidemia, Hypertension, Osteoporosis, Sprain of lumbar region, Unspecified hypothyroidism, Wears dentures, and Wears glasses.     Past Surgical History:   has a past surgical history that includes Appendectomy (8); Dilation and curettage of uterus (BEFORE ); Hysterectomy (); and Carotid endarterectomy (Left, 2017). Medications:    Reviewed in Epic     Allergies:  Sulfa antibiotics    Social History:   reports that she quit smoking about 33 years ago. She has a 16.50 pack-year smoking history. She has never used smokeless tobacco. She reports that she does not drink alcohol or use drugs. Family History: family history includes Diabetes in her father; Heart Disease in her father; High Blood Pressure in her mother; Stroke in her mother. REVIEW OF SYSTEMS:    Constitutional:loss of appetite and weight loss+  Respiratory: negative for cough , sputum, dyspnea, wheezing, hemoptysis, chest pain   Cardiovascular: negative for chest pain, dyspnea, palpitations, orthopnea, PND   Gastrointestinal: negative for nausea, vomiting, diarrhea, constipation, abdominal pain, Dysphagia, hematemesis and hematochezia   Genitourinary: negative for frequency, dysuria, nocturia, urinary incontinence, and hematuria   Integument: negative for rash, skin lesions, bruises.    Hematologic/Lymphatic: negative for easy bruising, bleeding, lymphadenopathy, or petechiae   Endocrine: negative for heat or cold intolerance,weight changes, change in bowel habits and hair loss   Musculoskeletal: b/l LE pain  Neurological: negative for headaches, dizziness, seizures, weakness, numbness    PHYSICAL EXAM:      BP (!) 137/56   Pulse 70   Temp 97.6 °F (36.4 °C) (Oral)   Resp 16   Ht 5' 2\" (1.575 m)   Wt 110 lb 8 oz (50.1 kg)   LMP 1975 (Approximate)   SpO2 92%   BMI 20.21 kg/m²    Temp (24hrs), Av.8 °F (36.6 °C), Min:96.4 °F (35.8 °C), Max:98.6 °F (37 °C)    General appearance - well appearing, no in pain or distress    Mouth - mucous membranes moist, pharynx normal without lesions   Neck - supple, no significant adenopathy   Lymphatics - no 10/15/2020    Thrombocytosis (Merribeth Graft) [D47.3] 10/13/2020    History of carotid endarterectomy [Z98.890] 10/13/2020    Constipation [K59.00] 10/13/2020    Severe tricuspid regurgitation [I07.1] 10/13/2020    Acute deep vein thrombosis (DVT) of right lower extremity (Merribeth Graft) [I82.401] 10/13/2020    Hyperlipidemia [E78.5] 10/26/2015    Hypothyroidism [E03.9]          IMPRESSION:   1. Severe fe def anemia  2. Reactive thrombocytosis from fe def anemia probably  3. Acute DVT of Rt leg involving gastrocnemius veins    RECOMMENDATIONS:  1. Reviewed labs and imaging studies. 2. Hb-stable. No active bleeding on EGD. Will need outpatient colonoscopy per GI. 3. Continue IV venofer 200 for 5 doses  4. 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 Supportive care per primary team  6. Weight loss and progressive weakness concerning for underlying malignancy. 7. OK to resume AC if no active bleed. Will discuss plan with Attending. Discussed with attending physician and Nurse. Deven Stephenson MD  Internal Medicine Resident, PGY-2  [address] [facility]; [facility]  10/15/2020, 2:07 PM    Attending Physician Statement   I have discussed the care of Krista Ortiz, including pertinent history and exam findings with the resident. I have reviewed the key elements of all parts of the encounter with the resident. I have seen and examined the patient with the resident. I agree with the assessment and plan and status of the problem list as documented.                                [address] [place] [address] [facility]                          Cell: [phone]

## 2020-10-16 LAB
-: NORMAL
ABSOLUTE EOS #: 0.15 K/UL (ref 0–0.44)
ABSOLUTE IMMATURE GRANULOCYTE: 0.3 K/UL (ref 0–0.3)
ABSOLUTE LYMPH #: 1.67 K/UL (ref 1.1–3.7)
ABSOLUTE MONO #: 1.67 K/UL (ref 0.1–1.2)
AMORPHOUS: NORMAL
ANION GAP SERPL CALCULATED.3IONS-SCNC: 12 MMOL/L (ref 9–17)
BACTERIA: NORMAL
BASOPHILS # BLD: 1 % (ref 0–2)
BASOPHILS ABSOLUTE: 0.15 K/UL (ref 0–0.2)
BILIRUBIN URINE: ABNORMAL
BUN BLDV-MCNC: 19 MG/DL (ref 8–23)
BUN/CREAT BLD: ABNORMAL (ref 9–20)
CALCIUM SERPL-MCNC: 8.3 MG/DL (ref 8.6–10.4)
CASTS UA: NORMAL /LPF (ref 0–8)
CHLORIDE BLD-SCNC: 106 MMOL/L (ref 98–107)
CO2: 21 MMOL/L (ref 20–31)
COLOR: ABNORMAL
COMMENT UA: ABNORMAL
CREAT SERPL-MCNC: 0.85 MG/DL (ref 0.5–0.9)
CRYSTALS, UA: NORMAL /HPF
DIFFERENTIAL TYPE: ABNORMAL
EOSINOPHILS RELATIVE PERCENT: 1 % (ref 1–4)
EPITHELIAL CELLS UA: NORMAL /HPF (ref 0–5)
GFR AFRICAN AMERICAN: >60 ML/MIN
GFR NON-AFRICAN AMERICAN: >60 ML/MIN
GFR SERPL CREATININE-BSD FRML MDRD: ABNORMAL ML/MIN/{1.73_M2}
GFR SERPL CREATININE-BSD FRML MDRD: ABNORMAL ML/MIN/{1.73_M2}
GLUCOSE BLD-MCNC: 109 MG/DL (ref 70–99)
GLUCOSE URINE: NEGATIVE
HCT VFR BLD CALC: 24.3 % (ref 36.3–47.1)
HCT VFR BLD CALC: 24.5 % (ref 36.3–47.1)
HCT VFR BLD CALC: 26.1 % (ref 36.3–47.1)
HCT VFR BLD CALC: 26.6 % (ref 36.3–47.1)
HEMOGLOBIN: 6.9 G/DL (ref 11.9–15.1)
HEMOGLOBIN: 7.3 G/DL (ref 11.9–15.1)
HEMOGLOBIN: 7.5 G/DL (ref 11.9–15.1)
HEMOGLOBIN: 7.6 G/DL (ref 11.9–15.1)
IMMATURE GRANULOCYTES: 2 %
KETONES, URINE: NEGATIVE
LEUKOCYTE ESTERASE, URINE: ABNORMAL
LYMPHOCYTES # BLD: 11 % (ref 24–43)
MAGNESIUM: 2.1 MG/DL (ref 1.6–2.6)
MCH RBC QN AUTO: 23.8 PG (ref 25.2–33.5)
MCHC RBC AUTO-ENTMCNC: 28.2 G/DL (ref 28.4–34.8)
MCV RBC AUTO: 84.4 FL (ref 82.6–102.9)
MONOCYTES # BLD: 11 % (ref 3–12)
MORPHOLOGY: ABNORMAL
MUCUS: NORMAL
NITRITE, URINE: NEGATIVE
NRBC AUTOMATED: 2.8 PER 100 WBC
OTHER OBSERVATIONS UA: NORMAL
PDW BLD-RTO: 22.5 % (ref 11.8–14.4)
PH UA: 5.5 (ref 5–8)
PLATELET # BLD: 933 K/UL (ref 138–453)
PLATELET ESTIMATE: ABNORMAL
PMV BLD AUTO: 11.1 FL (ref 8.1–13.5)
POTASSIUM SERPL-SCNC: 4.2 MMOL/L (ref 3.7–5.3)
PROTEIN UA: ABNORMAL
RBC # BLD: 3.15 M/UL (ref 3.95–5.11)
RBC # BLD: ABNORMAL 10*6/UL
RBC UA: NORMAL /HPF (ref 0–4)
RENAL EPITHELIAL, UA: NORMAL /HPF
SEG NEUTROPHILS: 74 % (ref 36–65)
SEGMENTED NEUTROPHILS ABSOLUTE COUNT: 11.26 K/UL (ref 1.5–8.1)
SODIUM BLD-SCNC: 139 MMOL/L (ref 135–144)
SPECIFIC GRAVITY UA: 1.05 (ref 1–1.03)
TRICHOMONAS: NORMAL
TURBIDITY: CLEAR
URINE HGB: NEGATIVE
UROBILINOGEN, URINE: NORMAL
WBC # BLD: 15.2 K/UL (ref 3.5–11.3)
WBC # BLD: ABNORMAL 10*3/UL
WBC UA: NORMAL /HPF (ref 0–5)
YEAST: NORMAL

## 2020-10-16 PROCEDURE — 85014 HEMATOCRIT: CPT

## 2020-10-16 PROCEDURE — 2060000000 HC ICU INTERMEDIATE R&B

## 2020-10-16 PROCEDURE — 36415 COLL VENOUS BLD VENIPUNCTURE: CPT

## 2020-10-16 PROCEDURE — 6360000002 HC RX W HCPCS: Performed by: INTERNAL MEDICINE

## 2020-10-16 PROCEDURE — 6370000000 HC RX 637 (ALT 250 FOR IP): Performed by: STUDENT IN AN ORGANIZED HEALTH CARE EDUCATION/TRAINING PROGRAM

## 2020-10-16 PROCEDURE — 99232 SBSQ HOSP IP/OBS MODERATE 35: CPT | Performed by: INTERNAL MEDICINE

## 2020-10-16 PROCEDURE — 6370000000 HC RX 637 (ALT 250 FOR IP): Performed by: INTERNAL MEDICINE

## 2020-10-16 PROCEDURE — 2580000003 HC RX 258: Performed by: INTERNAL MEDICINE

## 2020-10-16 PROCEDURE — 81001 URINALYSIS AUTO W/SCOPE: CPT

## 2020-10-16 PROCEDURE — 85018 HEMOGLOBIN: CPT

## 2020-10-16 PROCEDURE — 83735 ASSAY OF MAGNESIUM: CPT

## 2020-10-16 PROCEDURE — 80048 BASIC METABOLIC PNL TOTAL CA: CPT

## 2020-10-16 PROCEDURE — 85025 COMPLETE CBC W/AUTO DIFF WBC: CPT

## 2020-10-16 PROCEDURE — 94761 N-INVAS EAR/PLS OXIMETRY MLT: CPT

## 2020-10-16 RX ORDER — TRAMADOL HYDROCHLORIDE 50 MG/1
50 TABLET ORAL EVERY 6 HOURS PRN
Status: DISCONTINUED | OUTPATIENT
Start: 2020-10-16 | End: 2020-10-18 | Stop reason: HOSPADM

## 2020-10-16 RX ADMIN — TRAMADOL HYDROCHLORIDE 50 MG: 50 TABLET, FILM COATED ORAL at 10:07

## 2020-10-16 RX ADMIN — Medication 1 TABLET: at 07:55

## 2020-10-16 RX ADMIN — SODIUM CHLORIDE, PRESERVATIVE FREE 10 ML: 5 INJECTION INTRAVENOUS at 20:53

## 2020-10-16 RX ADMIN — APIXABAN 2.5 MG: 2.5 TABLET, FILM COATED ORAL at 20:52

## 2020-10-16 RX ADMIN — SODIUM CHLORIDE, PRESERVATIVE FREE 10 ML: 5 INJECTION INTRAVENOUS at 07:55

## 2020-10-16 RX ADMIN — ATORVASTATIN CALCIUM 10 MG: 10 TABLET, FILM COATED ORAL at 07:55

## 2020-10-16 RX ADMIN — LEVOTHYROXINE SODIUM 75 MCG: 75 TABLET ORAL at 06:41

## 2020-10-16 RX ADMIN — DOCUSATE SODIUM 100 MG: 100 CAPSULE, LIQUID FILLED ORAL at 07:55

## 2020-10-16 RX ADMIN — TRAMADOL HYDROCHLORIDE 50 MG: 50 TABLET, FILM COATED ORAL at 20:53

## 2020-10-16 RX ADMIN — HEPARIN SODIUM 5000 UNITS: 5000 INJECTION INTRAVENOUS; SUBCUTANEOUS at 04:56

## 2020-10-16 RX ADMIN — PANTOPRAZOLE SODIUM 40 MG: 40 TABLET, DELAYED RELEASE ORAL at 06:41

## 2020-10-16 RX ADMIN — METOPROLOL TARTRATE 25 MG: 25 TABLET ORAL at 20:52

## 2020-10-16 RX ADMIN — Medication 2000 UNITS: at 07:55

## 2020-10-16 RX ADMIN — METOPROLOL TARTRATE 25 MG: 25 TABLET ORAL at 07:55

## 2020-10-16 RX ADMIN — IRON SUCROSE 200 MG: 20 INJECTION, SOLUTION INTRAVENOUS at 07:55

## 2020-10-16 RX ADMIN — OXYCODONE HYDROCHLORIDE AND ACETAMINOPHEN 500 MG: 500 TABLET ORAL at 07:55

## 2020-10-16 ASSESSMENT — PAIN SCALES - GENERAL
PAINLEVEL_OUTOF10: 10
PAINLEVEL_OUTOF10: 9
PAINLEVEL_OUTOF10: 9
PAINLEVEL_OUTOF10: 7

## 2020-10-16 ASSESSMENT — PAIN DESCRIPTION - PAIN TYPE
TYPE: CHRONIC PAIN

## 2020-10-16 ASSESSMENT — PAIN DESCRIPTION - LOCATION
LOCATION: BACK

## 2020-10-16 ASSESSMENT — PAIN DESCRIPTION - DESCRIPTORS: DESCRIPTORS: ACHING

## 2020-10-16 ASSESSMENT — PAIN DESCRIPTION - ORIENTATION
ORIENTATION: LOWER

## 2020-10-16 ASSESSMENT — PAIN DESCRIPTION - ONSET: ONSET: PROGRESSIVE

## 2020-10-16 ASSESSMENT — PAIN - FUNCTIONAL ASSESSMENT: PAIN_FUNCTIONAL_ASSESSMENT: PREVENTS OR INTERFERES SOME ACTIVE ACTIVITIES AND ADLS

## 2020-10-16 ASSESSMENT — PAIN DESCRIPTION - FREQUENCY: FREQUENCY: INTERMITTENT

## 2020-10-16 NOTE — PROGRESS NOTES
Patient complaining of back pain. Tramadol 25 mg ordered one-time. Aida Duke M.D.   Internal Medicine Resident , PGY-3  [facility]  10/15/20 10:07 PM

## 2020-10-16 NOTE — PROGRESS NOTES
Progress Note    Today's Date: 10/16/2020  Patient Name: Kiesha Love  Date of admission: 10/13/2020 12:48 PM  Patient's age: 78 y.o., 1940  Admission Dx: Anemia [D64.9]    Reason for Consult: Thrombocytosis and anemia  Requesting Physician: Chet Truong MD    CHIEF COMPLAINT: b/l leg pain and lightheadedness    History Obtained From:  Patient and chart review    Interval History 10/15/2020  Patient seen and examined at bedside. Resting confortably in no acute distress. Afebrile, hemodynamically stable. Labs reviewed. Leukocytosis up to 15.2, Hb, stable. OK for full dose AC. Labs reviewed. Platelets down to 600. Hemoglobin 7.5 . EGD performed 10/14, negative for any bleeding. Will need outpatient colonoscopy. Remains on IV Venofer. HISTORY OF PRESENT ILLNESS:      The patient is a 78 y.o.  female with past medical history of carotid stenosis s/p left carotid endarterectomy colectomy (2017), essential hypertension, hypothyroidism, osteoporosis and superficial thrombophlebitis lower extremity (was on Coumadin several years ago, not on any anticoagulation currently). who is admitted to the hospital for B/l LE pain and lightheadedness. Pt also mentioned having loss of appetite and weight loss. She was found to have severe fe def anemia with Hb-5.6/MCV 77.6, Fe sat-6, ferritin-9. Platelet 087 . Per smear showed severe reactive thrombocytosis from fe def anemia. No schistocytes or cell abnormality mentioned  Her L/E DVT scan showed new  Right leg acute DVT of gastro-numerous. Pt was taken to OR for EGD in morning which was unremarkable for any acute bleed. Ct abd/pelvis showed no abn lesions. Past Medical History:   has a past medical history of Accident, Arthritis, Back pain, Compression fracture, Dizziness, Heart valve disease, Hx of blood clots, Hyperlipidemia, Hypertension, Osteoporosis, Sprain of lumbar region, Unspecified hypothyroidism, Wears dentures, and Wears glasses.     Past Surgical History:   has a past surgical history that includes Appendectomy (); Dilation and curettage of uterus (BEFORE ); Hysterectomy (); Carotid endarterectomy (Left, 2017); and Upper gastrointestinal endoscopy (N/A, 10/14/2020). Medications:    Reviewed in Epic     Allergies:  Sulfa antibiotics    Social History:   reports that she quit smoking about 33 years ago. She has a 16.50 pack-year smoking history. She has never used smokeless tobacco. She reports that she does not drink alcohol or use drugs. Family History: family history includes Diabetes in her father; Heart Disease in her father; High Blood Pressure in her mother; Stroke in her mother. REVIEW OF SYSTEMS:    Constitutional:loss of appetite and weight loss+  Respiratory: negative for cough , sputum, dyspnea, wheezing, hemoptysis, chest pain   Cardiovascular: negative for chest pain, dyspnea, palpitations, orthopnea, PND   Gastrointestinal: negative for nausea, vomiting, diarrhea, constipation, abdominal pain, Dysphagia, hematemesis and hematochezia   Genitourinary: negative for frequency, dysuria, nocturia, urinary incontinence, and hematuria   Integument: negative for rash, skin lesions, bruises.    Hematologic/Lymphatic: negative for easy bruising, bleeding, lymphadenopathy, or petechiae   Endocrine: negative for heat or cold intolerance,weight changes, change in bowel habits and hair loss   Musculoskeletal: b/l LE pain  Neurological: negative for headaches, dizziness, seizures, weakness, numbness    PHYSICAL EXAM:      BP (!) 142/80   Pulse 78   Temp 98 °F (36.7 °C) (Oral)   Resp 16   Ht 5' 2\" (1.575 m)   Wt 108 lb 6.4 oz (49.2 kg)   LMP 1975 (Approximate)   SpO2 97%   BMI 19.83 kg/m²    Temp (24hrs), Av.8 °F (36.6 °C), Min:97.4 °F (36.3 °C), Max:98.2 °F (36.8 °C)    General appearance - well appearing, no in pain or distress    Mouth - mucous membranes moist, pharynx normal without lesions   Neck - supple, no significant adenopathy   Lymphatics - no palpable lymphadenopathy, no hepatosplenomegaly   Chest - clear to auscultation, no wheezes, rales or rhonchi, symmetric air entry   Heart - normal rate, regular rhythm, normal S1, S2, no murmurs  Abdomen - soft, nontender, nondistended, no masses or organomegaly   Neurological - alert, oriented, normal speech, no focal findings or movement disorder noted   Musculoskeletal - no joint tenderness, deformity or swelling   Extremities - peripheral pulses normal, no pedal edema, no clubbing or cyanosis   Skin - normal coloration and turgor, no rashes, no suspicious skin lesions noted ,    DATA:    Labs:   CBC:   Recent Labs     10/15/20  0547  10/16/20  0519 10/16/20  1005   WBC 11.2  --  15.2*  --    HGB 7.5*   < > 7.5* 7.3*   HCT 26.5*   < > 26.6* 24.5*   *  --  933*  --     < > = values in this interval not displayed. BMP:   Recent Labs     10/15/20  0547 10/16/20  0519    139   K 3.9 4.2   CO2 24 21   BUN 17 19   CREATININE 0.84 0.85   LABGLOM >60 >60   GLUCOSE 83 109*     PT/INR: No results for input(s): PROTIME, INR in the last 72 hours. IMAGING DATA:    Doppler LE scan   Summary          Acute/sub-acute deep vein thrombosis of the right leg involving the     gastrocnemius veins.     Superficial thrombophlebitis of the right small saphenous vein in the     proximal calf. CT abd pelvis:   1.  Findings consistent with interstitial edema with small pleural effusions    and dependent atelectasis.         2.  Periportal edema, a nonspecific finding that may be related to hydration    therapy versus underlying liver inflammation.         3.  Patchy peripheral areas of decreased enhancement in the kidneys, which    can be seen with bilateral embolic disease with resulting developing infarcts    or possibly pyelonephritis.         4.  Moderate size hiatal hernia.          Primary Problem  Iron deficiency anemia    Active Hospital Problems by Pacheco Gale MD on 10/16/2020 at 3:48 PM

## 2020-10-16 NOTE — CARE COORDINATION
Transitional Planning  Spoke with patient at bedside. Given choice, goal is home care, and choice is [facility]. Discussed SNF as patient believes she may need more assistance. Choice is [facility]. eReferrals sent. [phone] - Call to [facility], Ursula Valiente ([phone]), confirmed receipt of fax. They care able to accept, but they do not give IV venofer at SNF, so patient would need to stay for duration of infusions.

## 2020-10-16 NOTE — FLOWSHEET NOTE
Assessment: The patient was in a good mood. The patient is hopeful to go to some rehab to help her before going home. Intervention:  engaged in active listening.  explored the patient's thoughts and feelings.  inquired about the patients support system; family, friends, and community groups.  asked if the patient would like prayer and informed the patient that chaplains are available 24/7. Outcome: The patient engaged in the conversation. 10/16/20 8880   Encounter Summary   Services provided to: Patient   Referral/Consult From: [address] [place] [address]   [facility] [address] [facility]   (10/16/20)   Complexity of Encounter Moderate   Length of Encounter 15 minutes   Spiritual Assessment Completed Yes   Routine   Type Initial   Assessment Calm; Approachable   Intervention Active listening;Explored feelings, thoughts, concerns;Explored coping resources;Nurtured hope;Discussed illness/injury and it's impact   Outcome Engaged in conversation;Expressed gratitude; Hopeful

## 2020-10-16 NOTE — PROGRESS NOTES
DATE: 10/16/2020    NAME: Estrella Peña  MRN: [id number]   : 1940    Patient not seen this date for Physical Therapy due to:  [] Blood transfusion in progress  [] Hemodialysis  [x]  Patient Declined     Pt reported that she just got back to bed and is too tired, but would be willing to do what she can later today, if possible. Pt's RN reported that the pt does well AMB in the room and could probably AMB around in the rollins; Will try to see pt later time permitting.  [] Spine Precautions   [] Strict Bedrest  [] Surgery/ Procedure  [] Testing      [] Other        [] PT being discontinued at this time. Patient independent. No further needs. [] PT being discontinued at this time as the patient has been transferred to palliative care. No further needs.     Cruz Solomon, PTA

## 2020-10-16 NOTE — CONSULTS
[address]      Patient's Name/ Date of Birth/ Gender: Avel Frank / 1940 (78 y.o.) / female     Referring Physician: Ayesha Brooks MD    Consulting Physician: Dr Bard Grey    History of present Illness: Pt is a 78 y.o. female with past medical history as listed below, chronic back pain on tramadol who presented to the ED on 10/13 with complaints of leg pain. Patient was found to have lower extremity edema and a gastroc DVT and superficial thrombophlebitis of the right small saphenous vein. The patient was additionally found to be anemic. Patient has had an extensive work-up including CTA of the abdomen pelvis which revealed incidental celiac artery, SMA, renal artery stenosis. The patient denies symptoms related to these findings. Patient complains only of chronic back pain and right lower extremity pain. Denies fever, chills, chest pain, shortness of breath. Denies sick contacts. Past Medical History:  has a past medical history of Accident, Arthritis, Back pain, Compression fracture, Dizziness, Heart valve disease, Hx of blood clots, Hyperlipidemia, Hypertension, Osteoporosis, Sprain of lumbar region, Unspecified hypothyroidism, Wears dentures, and Wears glasses. Past Surgical History:   Past Surgical History:   Procedure Laterality Date    APPENDECTOMY  1958    CAROTID ENDARTERECTOMY Left 9/25/2017    LEFT CAROTID ENDARTERECTOMY, XENOSURE BIOLOGIC PATCH performed by Barron Herrera DO at [facility]    X 4 PRIOR TO [facility] ENDOSCOPY N/A 10/14/2020    EGD BIOPSY performed by Beatrice Alejo MD at [facility]       Social History:  reports that she quit smoking about 33 years ago. She has a 16.50 pack-year smoking history. She has never used smokeless tobacco. She reports that she does not drink alcohol or use drugs.     Family History: family history includes Diabetes in her father; Heart Disease in her father; High Blood Pressure in her mother; Stroke in her mother.     Allergies: Sulfa antibiotics    Current Meds:  Current Facility-Administered Medications:     heparin (porcine) injection 5,000 Units, 5,000 Units, Subcutaneous, 3 times per day, Darcie Mcdaniels MD, 5,000 Units at 10/15/20 1529    [DISCONTINUED] pantoprazole (PROTONIX) injection 40 mg, 40 mg, Intravenous, BID **AND** [START ON 10/16/2020] sodium chloride (PF) 0.9 % injection 10 mL, 10 mL, Intravenous, BID, Darcie Mcdaniels MD    labetalol (NORMODYNE;TRANDATE) injection 10 mg, 10 mg, Intravenous, Q6H PRN, Darcie Mcdaniels MD    metoprolol tartrate (LOPRESSOR) tablet 25 mg, 25 mg, Oral, BID, Darcie Mcdaniels MD, 25 mg at 10/15/20 0839    pantoprazole (PROTONIX) tablet 40 mg, 40 mg, Oral, QAM AC, Darcie Mcdaniels MD, 40 mg at 10/15/20 0537    vitamin C (ASCORBIC ACID) tablet 500 mg, 500 mg, Oral, Daily, Darcie Mcdaniels MD, 500 mg at 10/15/20 6045    calcium carbonate-vitamin D (CALTRATE) 600-400 MG-UNIT per tab 1 tablet, 1 tablet, Oral, Daily with breakfast, Darice Mcdaniels MD, 1 tablet at 10/15/20 1348    Vitamin D (CHOLECALCIFEROL) tablet 2,000 Units, 2,000 Units, Oral, Daily, Darcie Mcdaniels MD, 2,000 Units at 10/15/20 0838    levothyroxine (SYNTHROID) tablet 75 mcg, 75 mcg, Oral, Daily, Darcie Mcdaniels MD, 75 mcg at 10/15/20 0537    atorvastatin (LIPITOR) tablet 10 mg, 10 mg, Oral, Daily, Darcie Mcdaniels MD, 10 mg at 10/15/20 0839    sodium chloride flush 0.9 % injection 10 mL, 10 mL, Intravenous, 2 times per day, Darcie Mcdaniels MD, 10 mL at 10/15/20 0838    sodium chloride flush 0.9 % injection 10 mL, 10 mL, Intravenous, PRN, Darcie Mcdaniels MD    acetaminophen (TYLENOL) tablet 650 mg, 650 mg, Oral, Q6H PRN, 650 mg at 10/14/20 0326 **OR** acetaminophen (TYLENOL) suppository 650 mg, 650 mg, Rectal, Q6H PRN, Darcie Mcdaniels MD    potassium chloride (KLOR-CON M) extended release tablet 40 mEq, 40 mEq, Oral, PRN **OR** potassium bicarb-citric acid (EFFER-K) effervescent tablet 40 mEq, 40 mEq, Oral, PRN **OR** potassium chloride 10 mEq/100 mL IVPB (Peripheral Line), 10 mEq, Intravenous, PRN, Luis Chaudhary MD    magnesium sulfate 2 g in 50 mL IVPB premix, 2 g, Intravenous, PRN, Luis Chaudhary MD    iron sucrose (VENOFER) 200 mg in sodium chloride 0.9 % 100 mL IVPB, 200 mg, Intravenous, Daily, Luis Chaudhary MD, Stopped at 10/15/20 1028    docusate sodium (COLACE) capsule 100 mg, 100 mg, Oral, Daily, Luis Chaudhary MD, 100 mg at 10/15/20 1937    REVIEW OF SYSTEMS:      Review of Systems - General ROS: negative for - chills, fatigue, fever or night sweats  Psychological ROS: negative for - anxiety, behavioral disorder or depression  Ophthalmic ROS: negative for - blurry vision, dry eyes or eye pain  ENT ROS: negative for - epistaxis, headaches or nasal congestion  Hematological and Lymphatic ROS: positive hx DVT  Endocrine ROS: negative for - malaise/lethargy, mood swings or palpitations  Respiratory ROS: negative for - cough, hemoptysis or shortness of breath  Cardiovascular ROS: no chest pain or dyspnea on exertion  Gastrointestinal ROS: no abdominal pain, constipation, hematochezia  Genito-Urinary ROS: no dysuria, trouble voiding, or hematuria  Musculoskeletal ROS: positive leg pain  Neurological ROS: negative for TIA or stroke like symptom  Dermatological ROS: negative for dry skin and eczema      PHYSICAL EXAM:    VITALS:  BP (!) 134/58   Pulse 84   Temp 98 °F (36.7 °C) (Oral)   Resp 24   Ht 5' 2\" (1.575 m)   Wt 110 lb 8 oz (50.1 kg)   LMP 09/22/1975 (Approximate)   SpO2 97%   BMI 20.21 kg/m²   INTAKE/OUTPUT:     Intake/Output Summary (Last 24 hours) at 10/15/2020 2104  Last data filed at 10/15/2020 1100  Gross per 24 hour   Intake 690 ml   Output --   Net 690 ml         CONSTITUTIONAL:  Alert and oriented, no acute distress  HEAD: normocephalic, atraumatic  EYES: Pupils equal and reactive to light, Extraocular muscles intact, sclera non icteric  ENT: Mucus membranes moist, No otorrhea, no rhinorrhea  NECK:  supple, symmetrical, trachea midline   LUNGS:  Good air movement bilaterally, unlabored respirations  CARDIOVASCULAR: Regular rate and rhythm  ABDOMEN: soft, non tender, non distended  MUSCULOSKELETAL:  Equal strength bilaterally, normal muscle tone  SKIN: No abscess or rash  Ext: Palpable signals on bilateral lower extremities  NEUROLOGIC:  Cranial nerves 2-12 grossly intact, no focal deficits  PSYCH: affect appropriate      Labs:   Lab Results   Component Value Date    WBC 11.2 10/15/2020    HGB 7.4 10/15/2020    HCT 27.4 10/15/2020    MCV 82.6 10/15/2020     10/15/2020     Lab Results   Component Value Date     10/15/2020    K 3.9 10/15/2020     10/15/2020    CO2 24 10/15/2020    BUN 17 10/15/2020    CREATININE 0.84 10/15/2020    GLUCOSE 83 10/15/2020    CALCIUM 8.1 10/15/2020     No results found for: INR    Imaging:  Xr Chest (single View Frontal)    Result Date: 10/13/2020  EXAMINATION: ONE XRAY VIEW OF THE CHEST 10/13/2020 8:24 am COMPARISON: None. HISTORY: ORDERING SYSTEM PROVIDED HISTORY: perla TECHNOLOGIST PROVIDED HISTORY: perla FINDINGS: Central silhouette appears enlarged. Atherosclerotic calcifications are identified. Exaggerated kyphotic curvature. Mildly prominent interstitial changes are identified diffusely, with right basilar airspace disease and a small right pleural effusion. No pneumothorax is identified. No acute osseous abnormality is identified. Degenerative changes in the shoulders. Calcific tendinitis of the right rotator cuff. Mildly prominent interstitial change seen diffusely within the lungs which may represent mild pulmonary edema. There is also a right basilar infiltrate and small effusion which could represent pneumonia.  Enlargement of the central silhouette may be related to cardiomegaly, pericardial effusion, as well as possible hiatal hernia. Patient had a small hiatal hernia noted on CT imaging in 2013. Ct Abdomen Pelvis W Iv Contrast Additional Contrast? None    Result Date: 10/14/2020  EXAMINATION: CT OF THE ABDOMEN AND PELVIS WITH CONTRAST 10/14/2020 11:58 am TECHNIQUE: CT of the abdomen and pelvis was performed with the administration of intravenous contrast. Multiplanar reformatted images are provided for review. Dose modulation, iterative reconstruction, and/or weight based adjustment of the mA/kV was utilized to reduce the radiation dose to as low as reasonably achievable. COMPARISON: 07/30/2013 HISTORY: ORDERING SYSTEM PROVIDED HISTORY: weight loss, anemia TECHNOLOGIST PROVIDED HISTORY: weight loss, anemia Reason for Exam: weight loss, anemia Acuity: Unknown Type of Exam: Unknown FINDINGS: Lower Chest: Small pleural effusions and dependent atelectasis. Septal thickening is noted. Moderate size hiatal hernia. Organs: Periportal edema. No focal abnormality otherwise appreciated in the liver. The portal veins appear appropriately opacified. The hepatic veins are not opacified on this exam.  Nonspecific diffuse gallbladder wall edema is noted. No stones or evidence for biliary dilatation. The pancreas, spleen, and adrenals appear grossly unremarkable. Patchy peripheral areas of decreased enhancement in the kidneys noted, left greater than right. The renal arteries and veins appear appropriately opacified, although not evaluated in detail on this exam. GI/Bowel: No evidence for bowel obstruction. The stomach is decompressed, limiting evaluation. Moderate stool burden is noted. Few scattered diverticula in the distal colon. Pelvis: The bladder is decompressed. Peritoneum/Retroperitoneum: Trace abdominopelvic ascites. No loculated fluid collection. No free air. The aorta is normal in caliber. Extensive calcified atheromatous plaque is present. No lymphadenopathy.  Bones/Soft Tissues: Mild body wall edema. Osteopenia. Multilevel compression fractures are present with sparing of T12 and L4. The majority of these compression deformities are new since 2013, except L1 and L5. no soft tissue hematoma. 1.  Findings consistent with interstitial edema with small pleural effusions and dependent atelectasis. 2.  Periportal edema, a nonspecific finding that may be related to hydration therapy versus underlying liver inflammation. 3.  Patchy peripheral areas of decreased enhancement in the kidneys, which can be seen with bilateral embolic disease with resulting developing infarcts or possibly pyelonephritis. 4.  Moderate size hiatal hernia. 5.  Nonspecific mild gallbladder wall thickening without stones or biliary dilatation. This is likely related to the underlying cause of periportal edema, as described above. 6.  Osteopenia. Multilevel compression fractures in the lower thoracic and lower lumbar spine, many of which appear new since 2013.        Impression:  Patient Active Problem List   Diagnosis    Hypothyroidism    Chronic back pain    Osteoporosis    Essential hypertension    Hyperlipidemia    Urinary incontinence    Stenosis of left carotid artery    Pulmonary hypertension (HCC)    Non-rheumatic mitral regurgitation    Non-rheumatic tricuspid valve insufficiency    Spondylosis of lumbosacral region without myelopathy or radiculopathy    Anemia    Thrombocytosis (HCC)    History of carotid endarterectomy    Constipation    Severe tricuspid regurgitation    Acute deep vein thrombosis (DVT) of right lower extremity (HCC)    DVT (deep vein thrombosis) in pregnancy    Iron deficiency anemia    Hiatal hernia    Osteopenia       3 78-year-old female with chronic back pain, right lower extremity gastroc DVT and superficial thrombophlebitis of the small saphenous vein and incidental findings of celiac artery, SMA, renal artery stenosis without symptoms    Recommendation:    1. Continue supportive care per primary  2. No vascular surgery interventions at this time  3. Vascular to sign off    Patient seen and examined at bedside with Dr Edinson Cleomns.     Danny Sutherland MD  General Surgery PGY3

## 2020-10-16 NOTE — PROGRESS NOTES
[facility]  Internal Medicine Teaching Residency Program  Inpatient Daily Progress Note  ______________________________________________________________________________    Patient: Litzy Larios  YOB: 1940   WQE:[id number]    Acct: [de-identified]     Room: 5/26-80  Admit date: 10/13/2020  Today's date: 10/16/20  Number of days in the hospital: 3    SUBJECTIVE   Admitting Diagnosis: Iron deficiency anemia  CC: Right leg pain   Pt examined at bedside. Chart & results reviewed. Hemodynamically stable Complains of back pain , Bowel movements Good after enema, pedal edema noted, no belly pain. Leucocytosis wbc 11<15.2, HB 6.9<7.3  Urinalysis trace protein and small leucocyte esterase       ROS:  Constitutional:  negative for chills, fevers, sweats  Respiratory:  negative for cough, dyspnea on exertion, hemoptysis, shortness of breath, wheezing  Cardiovascular:  negative for chest pain, chest pressure/discomfort, lower extremity edema, palpitations  Gastrointestinal:  negative for abdominal pain, constipation, diarrhea, nausea, vomiting  Neurological:  negative for dizziness, headache  BRIEF HISTORY     The patient is a pleasant 78 y. o. female with past medical history of hyperthyroidism, HTN , Urinary incontinence, carotid artery stenosis, Tricuspid valve insufficiency, Mitral regurgitation, chronic back pain, osteoporosis,presents  to the ED with a chief complaint of right leg pain     To begin with pt had right sided leg pain over the last 7 days which is sudden in onset, dull, no aggravating and relieving factors. She denies trauma to legs. Pt also complains of some dizziness and lightheadedness and found to have anemia with hb of 5.6. pt states that she also have constipation and takes colace to relieve constipation. She had noticed some blood in stool in remote past and had dark red stools too.  She denies any acute blood recently in stool and no bleeding.     Review of Systems   Constitutional: Positive for appetite change, fatigue and unexpected weight change. Gastrointestinal: Positive for blood in stool and constipation. Negative .    Neurological: Positive for light-headedness.       Initial Vitals on presentation : 154/66, pr 94, rr 18, temp of 99.1     Significant Labs :        Hb of 5.6>5.5 and platelets of 626,905, ferritin 9, probnp 2268, d dimer of 0.30, Troponin of 26>16,      Imaging:   CXR- Interstitial and right basilar infiltrates and small effusion and suspicious small hiatal hernia   Duplex lower limbs - acute DVT of right leg involving gastrocnemius vein and superficial thrombophlebitis of right small sephanous vein in the proximal calf. EKG - sinus rhythm and 1st degree AV block and prolonged QTc of 492  Treatment in ED :  1 prbc transfusion     10/14/2020- pending heme onch and Gi rec will monitor pt for now  10/15/2020-CT angiography abdomen, patient colonoscopy, pending recommendations for anticoagulation. 10/16/2020 - Discharge today after ac for DVT prophylaxis. OBJECTIVE     Vital Signs:  BP (!) 142/80   Pulse 78   Temp 98 °F (36.7 °C) (Oral)   Resp 16   Ht 5' 2\" (1.575 m)   Wt 108 lb 6.4 oz (49.2 kg)   LMP 1975 (Approximate)   SpO2 97%   BMI 19.83 kg/m²     Temp (24hrs), Av.8 °F (36.6 °C), Min:97.4 °F (36.3 °C), Max:98.2 °F (36.8 °C)    In: 360   Out: 400 [Urine:400]    Physical Exam:  Constitutional: This is a well developed, well nourished, 18.5-24.9 - Normal 78y.o. year old female who is alert, oriented, cooperative and in no apparent distress. Head:normocephalic and atraumatic. EENT:  PERRLA. No conjunctival injections. Septum was midline, mucosa was without erythema, exudates or cobblestoning. No thrush was noted. Neck: Supple without thyromegaly. No elevated JVP. Trachea was midline. Respiratory: Chest was symmetrical without dullness to percussion.   Breath sounds bilaterally were clear to auscultation. There were no wheezes, rhonchi or rales. There is no intercostal retraction or use of accessory muscles. No egophony noted. Cardiovascular: Regular without murmur, clicks, gallops or rubs. Abdomen: Slightly rounded and soft without organomegaly. No rebound, rigidity or guarding was appreciated. Lymphatic: No lymphadenopathy. Musculoskeletal: Back pain noted. Normal curvature of the spine. No gross muscle weakness. Extremities: pedal edema noted. No lower extremity edema, ulcerations, tenderness, varicosities or erythema. Muscle size, tone and strength are normal.  No involuntary movements are noted. Skin:  Warm and dry. Good color, turgor and pigmentation. No lesions or scars.   No cyanosis or clubbing  Neurological/Psychiatric: The patient's general behavior, level of consciousness, thought content and emotional status is normal.        Medications:  Scheduled Medications:    heparin (porcine)  5,000 Units Subcutaneous 3 times per day    sodium chloride (PF)  10 mL Intravenous BID    metoprolol tartrate  25 mg Oral BID    pantoprazole  40 mg Oral QAM AC    ascorbic acid  500 mg Oral Daily    calcium carbonate-vitamin D  1 tablet Oral Daily with breakfast    Vitamin D  2,000 Units Oral Daily    levothyroxine  75 mcg Oral Daily    atorvastatin  10 mg Oral Daily    sodium chloride flush  10 mL Intravenous 2 times per day    iron sucrose  200 mg Intravenous Daily    docusate sodium  100 mg Oral Daily     Continuous Infusions:   PRN Medicationslabetalol, 10 mg, Q6H PRN  sodium chloride flush, 10 mL, PRN  acetaminophen, 650 mg, Q6H PRN    Or  acetaminophen, 650 mg, Q6H PRN  potassium chloride, 40 mEq, PRN    Or  potassium alternative oral replacement, 40 mEq, PRN    Or  potassium chloride, 10 mEq, PRN  magnesium sulfate, 2 g, PRN        Diagnostic Labs:  CBC:   Recent Labs     10/14/20  0536  10/15/20  0547 10/15/20  2017 10/16/20  0151 10/16/20  0519   WBC 9.9  --  11.2  -- --  15.2*   RBC 3.09*  --  3.21*  --   --  3.15*   HGB 7.2*   < > 7.5* 7.4* 6.9* 7.5*   HCT 24.9*   < > 26.5* 27.4* 24.3* 26.6*   MCV 80.6*  --  82.6  --   --  84.4   RDW 20.0*  --  21.0*  --   --  22.5*   *  --  892*  --   --  933*    < > = values in this interval not displayed. BMP:   Recent Labs     10/14/20  0536 10/15/20  0547 10/16/20  0519    143 139   K 3.8 3.9 4.2    108* 106   CO2 21 24 21   BUN 17 17 19   CREATININE 0.95* 0.84 0.85     BNP: No results for input(s): BNP in the last 72 hours. PT/INR: No results for input(s): PROTIME, INR in the last 72 hours. APTT:   Recent Labs     10/13/20  1714   APTT 19.9*     CARDIAC ENZYMES: No results for input(s): CKMB, CKMBINDEX, TROPONINI in the last 72 hours. Invalid input(s): CKTOTAL;3  FASTING LIPID PANEL:  Lab Results   Component Value Date    CHOL 176 08/23/2019    HDL 92 08/23/2019    TRIG 48 08/23/2019     LIVER PROFILE:   Recent Labs     10/13/20  0834 10/13/20  1314   AST 29 29   ALT 18 18   BILIDIR  --  <0.08   BILITOT 0.29* 0.22*   ALKPHOS 69 73      MICROBIOLOGY: No results found for: CULTURE    Imaging:    Xr Chest (single View Frontal)    Result Date: 10/13/2020  Mildly prominent interstitial change seen diffusely within the lungs which may represent mild pulmonary edema. There is also a right basilar infiltrate and small effusion which could represent pneumonia. Enlargement of the central silhouette may be related to cardiomegaly, pericardial effusion, as well as possible hiatal hernia. Patient had a small hiatal hernia noted on CT imaging in 2013. Ct Abdomen Pelvis W Iv Contrast Additional Contrast? None    Result Date: 10/14/2020  1. Findings consistent with interstitial edema with small pleural effusions and dependent atelectasis. 2.  Periportal edema, a nonspecific finding that may be related to hydration therapy versus underlying liver inflammation.  3.  Patchy peripheral areas of decreased enhancement in the kidneys, which can be seen with bilateral embolic disease with resulting developing infarcts or possibly pyelonephritis. 4.  Moderate size hiatal hernia. 5.  Nonspecific mild gallbladder wall thickening without stones or biliary dilatation. This is likely related to the underlying cause of periportal edema, as described above. 6.  Osteopenia. Multilevel compression fractures in the lower thoracic and lower lumbar spine, many of which appear new since 2013. Cta Abdomen Pelvis W Contrast    Result Date: 10/15/2020  Moderate severe atherosclerotic changes with moderate severe ostial narrowing as detailed above involving mesenteric and renal arteries. .  No complete occlusion identified. ASSESSMENT & PLAN     ASSESSMENT / PLAN:     Principal Problem:    Iron deficiency anemia  Active Problems:    Hypothyroidism    Hyperlipidemia    Thrombocytosis (HCC)    History of carotid endarterectomy    Constipation    Severe tricuspid regurgitation    Acute deep vein thrombosis (DVT) of right lower extremity (HCC)    Hiatal hernia    Osteopenia  Resolved Problems:    * No resolved hospital problems. *    1. DVT of right leg.   - Pending Heme onchology rec  - would like to start elliquis On discharge     2. Severe Anemia secondary to iron deficiency vs GI bleed. -Post prbc hb of 7.2>6.9<7.3  - will have to monitor hb OP.    -Ganzoni Equation for iron deficieny Deficit  of 1266mg   -Continue on IV sucrose of 200mg   -GI consult EGD negative and op colonoscopy        3. H/o of carotid artery stenosis and hyperlipidemia. -CTA concerning for stenosis of celiac, renal artery  - vascular surgery recommends no surgical interventions at this point and signed off   - continue on atorvastatin 10mg, will resume aspirin     4. Hypothyroidism.  -continue on synthroid 75mcg      5. Osteopenia. -Vitamin d 2000 units daily, vitamin C and calcium carbonate OD      6.Suspected pneumonia -ruled out     7. Chronic back pain.   - tramadol 50mg oral      DVT ppx : on heparin  GI ppx: pantoprazole 40mg oral     PT/OT: ongoing  Discharge Planning / Victoriano Delacruz: Likely discharge today     Osiris Mccormack MD  Internal Medicine Resident, PGY-1  [address]  10/16/2020, 8:32 AM

## 2020-10-16 NOTE — PLAN OF CARE
Problem: Falls - Risk of:  Goal: Will remain free from falls  Description: Will remain free from falls  10/16/2020 0314 by Alma Rosa Mcdaniels RN  Outcome: Ongoing  10/15/2020 1810 by Kristyn Castellon RN  Outcome: Met This Shift  Goal: Absence of physical injury  Description: Absence of physical injury  10/16/2020 0314 by Alma Rosa Mcdaniels RN  Outcome: Ongoing  10/15/2020 1810 by Kristyn Castellon RN  Outcome: Met This Shift     Problem: Venous Thromboembolism:  Goal: Will show no signs or symptoms of venous thromboembolism  Description: Will show no signs or symptoms of venous thromboembolism  10/16/2020 0314 by Alma Rosa Mcdaniels RN  Outcome: Ongoing  10/15/2020 1810 by Kristyn Castellon RN  Outcome: Met This Shift  Goal: Absence of signs or symptoms of impaired coagulation  Description: Absence of signs or symptoms of impaired coagulation  10/16/2020 0314 by Alma Rosa Mcdaniels RN  Outcome: Ongoing  10/15/2020 1810 by Kristyn Castellon RN  Outcome: Ongoing     Problem: Pain:  Goal: Pain level will decrease  Description: Pain level will decrease  10/16/2020 0314 by Alma Rosa Mcdaniels RN  Outcome: Ongoing  10/15/2020 1810 by Kristyn Castellon RN  Outcome: Ongoing  Goal: Control of acute pain  Description: Control of acute pain  10/16/2020 0314 by Alma Rosa Mcdaniels RN  Outcome: Ongoing  10/15/2020 1810 by Kristyn Castellon RN  Outcome: Met This Shift  Goal: Control of chronic pain  Description: Control of chronic pain  10/16/2020 0314 by Alma Rosa Mcdaniels RN  Outcome: Ongoing  10/15/2020 1810 by Kristyn Castellon RN  Outcome: Ongoing

## 2020-10-16 NOTE — PROGRESS NOTES
Received perfect serve for the patient regarding her constipation and straining for stools. Ordered soapsuds enema for now. Richard Blum M.D.   Internal Medicine Resident , PGY-3  [facility]  10/15/20 8:47 PM

## 2020-10-16 NOTE — CARE COORDINATION
nH Predict tool has been uploaded into patient's chart and can be viewed under the media tab. Accompanying e-mail is as follows: Attached please find the [facility] Predict Outcome report for CELESTINO momin 1940. Greater gains expected with HH vs SNF, dc plan still being considered. Admitted with gastric DVT, thrombophlebitis of saphenous vein as well as anemia. No surgical intervention planned. Ind at baseline with self-cares and mobility, currently CGA with txs and gait of 30 ft, sup-CGA with bathing/dressing, toileting. Needs can likely be met with a home discharge and supportive services/family assist, however will follow for possible changes to post-acute needs.      Thank Luca [place], MARSHALL CCP  Telephonic SICC/ TCC  M: [phone]

## 2020-10-17 LAB
ABSOLUTE EOS #: 0.14 K/UL (ref 0–0.44)
ABSOLUTE IMMATURE GRANULOCYTE: 0.43 K/UL (ref 0–0.3)
ABSOLUTE LYMPH #: 1.56 K/UL (ref 1.1–3.7)
ABSOLUTE MONO #: 1.56 K/UL (ref 0.1–1.2)
ANION GAP SERPL CALCULATED.3IONS-SCNC: 14 MMOL/L (ref 9–17)
BASOPHILS # BLD: 1 % (ref 0–2)
BASOPHILS ABSOLUTE: 0.14 K/UL (ref 0–0.2)
BUN BLDV-MCNC: 20 MG/DL (ref 8–23)
BUN/CREAT BLD: ABNORMAL (ref 9–20)
CALCIUM SERPL-MCNC: 8.4 MG/DL (ref 8.6–10.4)
CHLORIDE BLD-SCNC: 102 MMOL/L (ref 98–107)
CO2: 21 MMOL/L (ref 20–31)
CREAT SERPL-MCNC: 0.86 MG/DL (ref 0.5–0.9)
DIFFERENTIAL TYPE: ABNORMAL
EOSINOPHILS RELATIVE PERCENT: 1 % (ref 1–4)
GFR AFRICAN AMERICAN: >60 ML/MIN
GFR NON-AFRICAN AMERICAN: >60 ML/MIN
GFR SERPL CREATININE-BSD FRML MDRD: ABNORMAL ML/MIN/{1.73_M2}
GFR SERPL CREATININE-BSD FRML MDRD: ABNORMAL ML/MIN/{1.73_M2}
GLUCOSE BLD-MCNC: 101 MG/DL (ref 70–99)
HCT VFR BLD CALC: 25.5 % (ref 36.3–47.1)
HCT VFR BLD CALC: 26.3 % (ref 36.3–47.1)
HCT VFR BLD CALC: 26.6 % (ref 36.3–47.1)
HCT VFR BLD CALC: 27.5 % (ref 36.3–47.1)
HEMOGLOBIN: 6.9 G/DL (ref 11.9–15.1)
HEMOGLOBIN: 7.4 G/DL (ref 11.9–15.1)
HEMOGLOBIN: 7.5 G/DL (ref 11.9–15.1)
HEMOGLOBIN: 7.6 G/DL (ref 11.9–15.1)
IMMATURE GRANULOCYTES: 3 %
LYMPHOCYTES # BLD: 11 % (ref 24–43)
MCH RBC QN AUTO: 23.9 PG (ref 25.2–33.5)
MCHC RBC AUTO-ENTMCNC: 27.6 G/DL (ref 28.4–34.8)
MCV RBC AUTO: 86.5 FL (ref 82.6–102.9)
MONOCYTES # BLD: 11 % (ref 3–12)
MORPHOLOGY: ABNORMAL
NRBC AUTOMATED: 3.2 PER 100 WBC
PDW BLD-RTO: 24 % (ref 11.8–14.4)
PLATELET # BLD: 811 K/UL (ref 138–453)
PLATELET ESTIMATE: ABNORMAL
PMV BLD AUTO: 11.1 FL (ref 8.1–13.5)
POTASSIUM SERPL-SCNC: 4.4 MMOL/L (ref 3.7–5.3)
PROCALCITONIN: 0.07 NG/ML
RBC # BLD: 3.18 M/UL (ref 3.95–5.11)
RBC # BLD: ABNORMAL 10*6/UL
SEG NEUTROPHILS: 73 % (ref 36–65)
SEGMENTED NEUTROPHILS ABSOLUTE COUNT: 10.37 K/UL (ref 1.5–8.1)
SODIUM BLD-SCNC: 137 MMOL/L (ref 135–144)
WBC # BLD: 14.2 K/UL (ref 3.5–11.3)
WBC # BLD: ABNORMAL 10*3/UL

## 2020-10-17 PROCEDURE — 99232 SBSQ HOSP IP/OBS MODERATE 35: CPT | Performed by: INTERNAL MEDICINE

## 2020-10-17 PROCEDURE — 86850 RBC ANTIBODY SCREEN: CPT

## 2020-10-17 PROCEDURE — 6370000000 HC RX 637 (ALT 250 FOR IP): Performed by: STUDENT IN AN ORGANIZED HEALTH CARE EDUCATION/TRAINING PROGRAM

## 2020-10-17 PROCEDURE — 6360000002 HC RX W HCPCS: Performed by: INTERNAL MEDICINE

## 2020-10-17 PROCEDURE — 94761 N-INVAS EAR/PLS OXIMETRY MLT: CPT

## 2020-10-17 PROCEDURE — 2060000000 HC ICU INTERMEDIATE R&B

## 2020-10-17 PROCEDURE — 86900 BLOOD TYPING SEROLOGIC ABO: CPT

## 2020-10-17 PROCEDURE — 86901 BLOOD TYPING SEROLOGIC RH(D): CPT

## 2020-10-17 PROCEDURE — 6370000000 HC RX 637 (ALT 250 FOR IP): Performed by: INTERNAL MEDICINE

## 2020-10-17 PROCEDURE — 85018 HEMOGLOBIN: CPT

## 2020-10-17 PROCEDURE — 85014 HEMATOCRIT: CPT

## 2020-10-17 PROCEDURE — 85025 COMPLETE CBC W/AUTO DIFF WBC: CPT

## 2020-10-17 PROCEDURE — 80048 BASIC METABOLIC PNL TOTAL CA: CPT

## 2020-10-17 PROCEDURE — 2580000003 HC RX 258: Performed by: INTERNAL MEDICINE

## 2020-10-17 PROCEDURE — 84145 PROCALCITONIN (PCT): CPT

## 2020-10-17 PROCEDURE — 36415 COLL VENOUS BLD VENIPUNCTURE: CPT

## 2020-10-17 RX ORDER — POLYETHYLENE GLYCOL 3350 17 G/17G
17 POWDER, FOR SOLUTION ORAL DAILY
Status: DISCONTINUED | OUTPATIENT
Start: 2020-10-17 | End: 2020-10-18 | Stop reason: HOSPADM

## 2020-10-17 RX ORDER — CEPHALEXIN 250 MG/1
250 CAPSULE ORAL EVERY 6 HOURS SCHEDULED
Status: DISCONTINUED | OUTPATIENT
Start: 2020-10-17 | End: 2020-10-18 | Stop reason: HOSPADM

## 2020-10-17 RX ORDER — FERROUS SULFATE 325(65) MG
325 TABLET ORAL 2 TIMES DAILY
Qty: 180 TABLET | Refills: 3 | Status: SHIPPED | OUTPATIENT
Start: 2020-10-17 | End: 2020-11-12 | Stop reason: SDUPTHER

## 2020-10-17 RX ORDER — POLYETHYLENE GLYCOL 3350 17 G/17G
17 POWDER, FOR SOLUTION ORAL DAILY
Qty: 527 G | Refills: 1 | Status: SHIPPED | OUTPATIENT
Start: 2020-10-18 | End: 2020-11-12 | Stop reason: SDUPTHER

## 2020-10-17 RX ORDER — PROMETHAZINE HYDROCHLORIDE 12.5 MG/1
12.5 TABLET ORAL EVERY 6 HOURS PRN
Status: DISCONTINUED | OUTPATIENT
Start: 2020-10-17 | End: 2020-10-18 | Stop reason: HOSPADM

## 2020-10-17 RX ORDER — BISACODYL 10 MG
10 SUPPOSITORY, RECTAL RECTAL ONCE
Status: COMPLETED | OUTPATIENT
Start: 2020-10-17 | End: 2020-10-17

## 2020-10-17 RX ORDER — CEPHALEXIN 250 MG/1
250 CAPSULE ORAL EVERY 6 HOURS
Qty: 20 CAPSULE | Refills: 0 | Status: SHIPPED | OUTPATIENT
Start: 2020-10-17 | End: 2020-10-22

## 2020-10-17 RX ORDER — 0.9 % SODIUM CHLORIDE 0.9 %
20 INTRAVENOUS SOLUTION INTRAVENOUS ONCE
Status: COMPLETED | OUTPATIENT
Start: 2020-10-17 | End: 2020-10-18

## 2020-10-17 RX ADMIN — OXYCODONE HYDROCHLORIDE AND ACETAMINOPHEN 500 MG: 500 TABLET ORAL at 07:59

## 2020-10-17 RX ADMIN — APIXABAN 2.5 MG: 2.5 TABLET, FILM COATED ORAL at 07:59

## 2020-10-17 RX ADMIN — Medication 1 TABLET: at 07:59

## 2020-10-17 RX ADMIN — TRAMADOL HYDROCHLORIDE 50 MG: 50 TABLET, FILM COATED ORAL at 07:59

## 2020-10-17 RX ADMIN — CEPHALEXIN 250 MG: 250 CAPSULE ORAL at 12:29

## 2020-10-17 RX ADMIN — POLYETHYLENE GLYCOL 3350 17 G: 17 POWDER, FOR SOLUTION ORAL at 11:47

## 2020-10-17 RX ADMIN — DOCUSATE SODIUM 100 MG: 100 CAPSULE, LIQUID FILLED ORAL at 07:59

## 2020-10-17 RX ADMIN — SODIUM CHLORIDE, PRESERVATIVE FREE 10 ML: 5 INJECTION INTRAVENOUS at 07:58

## 2020-10-17 RX ADMIN — CEPHALEXIN 250 MG: 250 CAPSULE ORAL at 17:31

## 2020-10-17 RX ADMIN — IRON SUCROSE 200 MG: 20 INJECTION, SOLUTION INTRAVENOUS at 08:01

## 2020-10-17 RX ADMIN — SODIUM CHLORIDE, PRESERVATIVE FREE 10 ML: 5 INJECTION INTRAVENOUS at 20:24

## 2020-10-17 RX ADMIN — ATORVASTATIN CALCIUM 10 MG: 10 TABLET, FILM COATED ORAL at 07:59

## 2020-10-17 RX ADMIN — ACETAMINOPHEN 650 MG: 325 TABLET ORAL at 17:31

## 2020-10-17 RX ADMIN — METOPROLOL TARTRATE 25 MG: 25 TABLET ORAL at 07:59

## 2020-10-17 RX ADMIN — PANTOPRAZOLE SODIUM 40 MG: 40 TABLET, DELAYED RELEASE ORAL at 06:24

## 2020-10-17 RX ADMIN — LEVOTHYROXINE SODIUM 75 MCG: 75 TABLET ORAL at 06:24

## 2020-10-17 RX ADMIN — APIXABAN 2.5 MG: 2.5 TABLET, FILM COATED ORAL at 20:24

## 2020-10-17 RX ADMIN — BISACODYL 10 MG: 10 SUPPOSITORY RECTAL at 11:47

## 2020-10-17 RX ADMIN — Medication 2000 UNITS: at 07:59

## 2020-10-17 ASSESSMENT — PAIN DESCRIPTION - ORIENTATION
ORIENTATION: MID;LOWER
ORIENTATION: LOWER

## 2020-10-17 ASSESSMENT — PAIN DESCRIPTION - LOCATION
LOCATION: BACK
LOCATION: BACK

## 2020-10-17 ASSESSMENT — PAIN DESCRIPTION - ONSET
ONSET: ON-GOING
ONSET: ON-GOING

## 2020-10-17 ASSESSMENT — PAIN DESCRIPTION - PAIN TYPE
TYPE: CHRONIC PAIN
TYPE: CHRONIC PAIN

## 2020-10-17 ASSESSMENT — PAIN DESCRIPTION - DESCRIPTORS
DESCRIPTORS: ACHING;DISCOMFORT
DESCRIPTORS: ACHING

## 2020-10-17 ASSESSMENT — PAIN - FUNCTIONAL ASSESSMENT: PAIN_FUNCTIONAL_ASSESSMENT: PREVENTS OR INTERFERES SOME ACTIVE ACTIVITIES AND ADLS

## 2020-10-17 ASSESSMENT — PAIN SCALES - GENERAL
PAINLEVEL_OUTOF10: 3
PAINLEVEL_OUTOF10: 6
PAINLEVEL_OUTOF10: 7

## 2020-10-17 ASSESSMENT — PAIN DESCRIPTION - FREQUENCY
FREQUENCY: INTERMITTENT
FREQUENCY: INTERMITTENT

## 2020-10-17 NOTE — PLAN OF CARE
Problem: Falls - Risk of:  Goal: Will remain free from falls  Description: Will remain free from falls  10/17/2020 0823 by Xander Hernandez RN  Outcome: Ongoing     Problem: Falls - Risk of:  Goal: Absence of physical injury  Description: Absence of physical injury  10/17/2020 0823 by Xander Hernandez RN  Outcome: Ongoing     Problem: Venous Thromboembolism:  Goal: Will show no signs or symptoms of venous thromboembolism  Description: Will show no signs or symptoms of venous thromboembolism  10/17/2020 0823 by Xander Hernandez RN  Outcome: Ongoing     Problem: Venous Thromboembolism:  Goal: Absence of signs or symptoms of impaired coagulation  Description: Absence of signs or symptoms of impaired coagulation  10/17/2020 0823 by Xander Hernandez RN  Outcome: Ongoing     Problem: Pain:  Goal: Pain level will decrease  Description: Pain level will decrease  10/17/2020 0823 by Xander Hernandez RN  Outcome: Ongoing     Problem: Pain:  Goal: Control of acute pain  Description: Control of acute pain  10/17/2020 0823 by Xander Hernandez RN  Outcome: Ongoing     Problem: Pain:  Goal: Control of chronic pain  Description: Control of chronic pain  10/17/2020 0823 by Xander Hernandez RN  Outcome: Ongoing

## 2020-10-17 NOTE — CARE COORDINATION
Skylersussy Hands [phone] from [facility] at [facility] left voicemail asking if patient can be admitted today as she is now on PO iron. Awaiting return call. Called facility and was then given [address] number.     [phone] received call from [address] in [facility], she states that she can accept patient  Until the AM. Notified patient

## 2020-10-17 NOTE — PROGRESS NOTES
Progress Note    Today's Date: 10/17/2020  Patient Name: Raz Shafer  Date of admission: 10/13/2020 12:48 PM  Patient's age: 78 y.o., 1940  Admission Dx: Anemia [D64.9]    Reason for Consult: Thrombocytosis and anemia  Requesting Physician: Flor Cortes MD    CHIEF COMPLAINT: b/l leg pain and lightheadedness    History Obtained From:  Patient and chart review    Interval History   Patient seen and examined at bedside. Resting confortably in no acute distress. Right upper arm pain overnight,   Vitals reviewed. Afebrile, hemodynamically stable. No active bleeding. Hemoglobin 7.6 . EGD was negative for any bleeding. Will need outpatient colonoscopy. Remains on IV Venofer. Possible discharge today. HISTORY OF PRESENT ILLNESS:      The patient is a 78 y.o.  female with past medical history of carotid stenosis s/p left carotid endarterectomy colectomy (2017), essential hypertension, hypothyroidism, osteoporosis and superficial thrombophlebitis lower extremity (was on Coumadin several years ago, not on any anticoagulation currently). who is admitted to the hospital for B/l LE pain and lightheadedness. Pt also mentioned having loss of appetite and weight loss. She was found to have severe fe def anemia with Hb-5.6/MCV 77.6, Fe sat-6, ferritin-9. Platelet 470 . Per smear showed severe reactive thrombocytosis from fe def anemia. No schistocytes or cell abnormality mentioned  Her L/E DVT scan showed new  Right leg acute DVT of gastro-numerous. Pt was taken to OR for EGD in morning which was unremarkable for any acute bleed. Ct abd/pelvis showed no abn lesions. Past Medical History:   has a past medical history of Accident, Arthritis, Back pain, Compression fracture, Dizziness, Heart valve disease, Hx of blood clots, Hyperlipidemia, Hypertension, Osteoporosis, Sprain of lumbar region, Unspecified hypothyroidism, Wears dentures, and Wears glasses.     Past Surgical History:   has a past surgical history palpable lymphadenopathy, no hepatosplenomegaly   Chest - clear to auscultation, no wheezes, rales or rhonchi, symmetric air entry   Heart - normal rate, regular rhythm, normal S1, S2, no murmurs  Abdomen - soft, nontender, nondistended, no masses or organomegaly   Neurological - alert, oriented, normal speech, no focal findings or movement disorder noted   Musculoskeletal - no joint tenderness, deformity or swelling   Extremities - peripheral pulses normal, no pedal edema, no clubbing or cyanosis   Skin - normal coloration and turgor, no rashes, no suspicious skin lesions noted ,    DATA:    Labs:   CBC:   Recent Labs     10/16/20  0519  10/17/20  0723 10/17/20  1426   WBC 15.2*  --  14.2*  --    HGB 7.5*   < > 7.6* 7.5*   HCT 26.6*   < > 27.5* 26.6*   *  --  811*  --     < > = values in this interval not displayed. BMP:   Recent Labs     10/16/20  0519 10/17/20  0723    137   K 4.2 4.4   CO2 21 21   BUN 19 20   CREATININE 0.85 0.86   LABGLOM >60 >60   GLUCOSE 109* 101*     PT/INR: No results for input(s): PROTIME, INR in the last 72 hours. IMAGING DATA:    Doppler LE scan   Summary          Acute/sub-acute deep vein thrombosis of the right leg involving the     gastrocnemius veins.     Superficial thrombophlebitis of the right small saphenous vein in the     proximal calf. CT abd pelvis:   1.  Findings consistent with interstitial edema with small pleural effusions    and dependent atelectasis.         2.  Periportal edema, a nonspecific finding that may be related to hydration    therapy versus underlying liver inflammation.         3.  Patchy peripheral areas of decreased enhancement in the kidneys, which    can be seen with bilateral embolic disease with resulting developing infarcts    or possibly pyelonephritis.         4.  Moderate size hiatal hernia.          Primary Problem  Iron deficiency anemia    Active Hospital Problems    Diagnosis Date Noted    Iron deficiency anemia

## 2020-10-17 NOTE — PLAN OF CARE
Problem: Falls - Risk of:  Goal: Will remain free from falls  Description: Will remain free from falls  Outcome: Met This Shift     Problem: Falls - Risk of:  Goal: Absence of physical injury  Description: Absence of physical injury  Outcome: Met This Shift     Problem: Venous Thromboembolism:  Goal: Will show no signs or symptoms of venous thromboembolism  Description: Will show no signs or symptoms of venous thromboembolism  Outcome: Met This Shift     Problem: Venous Thromboembolism:  Goal: Absence of signs or symptoms of impaired coagulation  Description: Absence of signs or symptoms of impaired coagulation  Outcome: Met This Shift     Problem: Pain:  Goal: Control of acute pain  Description: Control of acute pain  Outcome: Met This Shift     Problem: Pain:  Goal: Pain level will decrease  Description: Pain level will decrease  Outcome: Ongoing     Problem: Pain:  Goal: Control of chronic pain  Description: Control of chronic pain  Outcome: Ongoing

## 2020-10-17 NOTE — PROGRESS NOTES
[facility]  Internal Medicine Teaching Residency Program  Inpatient Daily Progress Note  ______________________________________________________________________________    Patient: Gregoria Amaro  YOB: 1940   EBD:[id number]    Acct: [de-identified]     Room: 5/26-80  Admit date: 10/13/2020  Today's date: 10/17/20  Number of days in the hospital: 4    SUBJECTIVE   Admitting Diagnosis: Iron deficiency anemia  CC: Right leg pain   Pt examined at bedside. Chart & results reviewed. Hemodynamically stable Complains of back pain ,denies any other complaints, wants to go home. Bowel movements Good after enema, still has mild pedal edema. Leucocytosis wbc 11<15.2>14.2, HB 6.9<7.3<7.4<7.6  Urinalysis trace protein and small leucocyte esterase       ROS:  Constitutional:  negative for chills, fevers, sweats  Respiratory:  negative for cough, dyspnea on exertion, hemoptysis, shortness of breath, wheezing  Cardiovascular:  negative for chest pain, chest pressure/discomfort, lower extremity edema, palpitations  Gastrointestinal:  negative for abdominal pain, constipation, diarrhea, nausea, vomiting  Neurological:  negative for dizziness, headache  BRIEF HISTORY     The patient is a pleasant 78 y. o. female with past medical history of hyperthyroidism, HTN , Urinary incontinence, carotid artery stenosis, Tricuspid valve insufficiency, Mitral regurgitation, chronic back pain, osteoporosis,presents  to the ED with a chief complaint of right leg pain     To begin with pt had right sided leg pain over the last 7 days which is sudden in onset, dull, no aggravating and relieving factors. She denies trauma to legs. Pt also complains of some dizziness and lightheadedness and found to have anemia with hb of 5.6. pt states that she also have constipation and takes colace to relieve constipation. She had noticed some blood in stool in remote past and had dark red stools too.  She denies any acute blood recently in stool and no bleeding.     Review of Systems   Constitutional: Positive for appetite change, fatigue and unexpected weight change. Gastrointestinal: Positive for blood in stool and constipation. Negative .    Neurological: Positive for light-headedness.       Initial Vitals on presentation : 154/66, pr 94, rr 18, temp of 99.1     Significant Labs :        Hb of 5.6>5.5 and platelets of 133,235, ferritin 9, probnp 2268, d dimer of 0.30, Troponin of 26>16,      Imaging:   CXR- Interstitial and right basilar infiltrates and small effusion and suspicious small hiatal hernia   Duplex lower limbs - acute DVT of right leg involving gastrocnemius vein and superficial thrombophlebitis of right small sephanous vein in the proximal calf. EKG - sinus rhythm and 1st degree AV block and prolonged QTc of 492  Treatment in ED :  1 prbc transfusion     10/14/2020- pending heme onch and Gi rec will monitor pt for now  10/15/2020-CT angiography abdomen, patient colonoscopy, pending recommendations for anticoagulation. 10/16/2020 - Discharge today after ac for DVT prophylaxis. OBJECTIVE     Vital Signs:  BP (!) 146/74   Pulse 74   Temp 97.6 °F (36.4 °C) (Oral)   Resp 23   Ht 5' 2\" (1.575 m)   Wt 104 lb (47.2 kg)   LMP 1975 (Approximate)   SpO2 90%   BMI 19.02 kg/m²     Temp (24hrs), Av.5 °F (36.4 °C), Min:97.4 °F (36.3 °C), Max:97.6 °F (36.4 °C)    In: 390   Out: -     Physical Exam:  Constitutional: This is a well developed, well nourished, 18.5-24.9 - Normal 78y.o. year old female who is alert, oriented, cooperative and in no apparent distress. Head:normocephalic and atraumatic. EENT:  PERRLA. No conjunctival injections. Septum was midline, mucosa was without erythema, exudates or cobblestoning. No thrush was noted. Neck: Supple without thyromegaly. No elevated JVP. Trachea was midline. Respiratory: Chest was symmetrical without dullness to percussion.   Breath sounds bilaterally were clear to auscultation. There were no wheezes, rhonchi or rales. There is no intercostal retraction or use of accessory muscles. No egophony noted. Cardiovascular: Regular without murmur, clicks, gallops or rubs. Abdomen: Slightly rounded and soft without organomegaly. No rebound, rigidity or guarding was appreciated. Lymphatic: No lymphadenopathy. Musculoskeletal: Back pain noted. Normal curvature of the spine. No gross muscle weakness. Extremities:Pedal edema noted. No lower extremity edema, ulcerations, tenderness, varicosities or erythema. Muscle size, tone and strength are normal.  No involuntary movements are noted. Skin:  Warm and dry. Good color, turgor and pigmentation. No lesions or scars.   No cyanosis or clubbing  Neurological/Psychiatric: The patient's general behavior, level of consciousness, thought content and emotional status is normal.        Medications:  Scheduled Medications:    apixaban  2.5 mg Oral BID    sodium chloride (PF)  10 mL Intravenous BID    metoprolol tartrate  25 mg Oral BID    pantoprazole  40 mg Oral QAM AC    ascorbic acid  500 mg Oral Daily    calcium carbonate-vitamin D  1 tablet Oral Daily with breakfast    Vitamin D  2,000 Units Oral Daily    levothyroxine  75 mcg Oral Daily    atorvastatin  10 mg Oral Daily    sodium chloride flush  10 mL Intravenous 2 times per day    iron sucrose  200 mg Intravenous Daily    docusate sodium  100 mg Oral Daily     Continuous Infusions:   PRN Medicationspromethazine, 12.5 mg, Q6H PRN  traMADol, 50 mg, Q6H PRN  labetalol, 10 mg, Q6H PRN  sodium chloride flush, 10 mL, PRN  acetaminophen, 650 mg, Q6H PRN    Or  acetaminophen, 650 mg, Q6H PRN  potassium chloride, 40 mEq, PRN    Or  potassium alternative oral replacement, 40 mEq, PRN    Or  potassium chloride, 10 mEq, PRN  magnesium sulfate, 2 g, PRN        Diagnostic Labs:  CBC:   Recent Labs     10/15/20  0547  10/16/20  0519  10/16/20  1650 10/17/20  0041 10/17/20  0723   WBC 11.2  --  15.2*  --   --   --  14.2*   RBC 3.21*  --  3.15*  --   --   --  3.18*   HGB 7.5*   < > 7.5*   < > 7.6* 7.4* 7.6*   HCT 26.5*   < > 26.6*   < > 26.1* 26.3* 27.5*   MCV 82.6  --  84.4  --   --   --  86.5   RDW 21.0*  --  22.5*  --   --   --  24.0*   *  --  933*  --   --   --  811*    < > = values in this interval not displayed. BMP:   Recent Labs     10/15/20  0547 10/16/20  0519    139   K 3.9 4.2   * 106   CO2 24 21   BUN 17 19   CREATININE 0.84 0.85     BNP: No results for input(s): BNP in the last 72 hours. PT/INR: No results for input(s): PROTIME, INR in the last 72 hours. APTT:   No results for input(s): APTT in the last 72 hours. CARDIAC ENZYMES: No results for input(s): CKMB, CKMBINDEX, TROPONINI in the last 72 hours. Invalid input(s): CKTOTAL;3  FASTING LIPID PANEL:  Lab Results   Component Value Date    CHOL 176 08/23/2019    HDL 92 08/23/2019    TRIG 48 08/23/2019     LIVER PROFILE:   No results for input(s): AST, ALT, ALB, BILIDIR, BILITOT, ALKPHOS in the last 72 hours. MICROBIOLOGY: No results found for: CULTURE    Imaging:    Xr Chest (single View Frontal)    Result Date: 10/13/2020  Mildly prominent interstitial change seen diffusely within the lungs which may represent mild pulmonary edema. There is also a right basilar infiltrate and small effusion which could represent pneumonia. Enlargement of the central silhouette may be related to cardiomegaly, pericardial effusion, as well as possible hiatal hernia. Patient had a small hiatal hernia noted on CT imaging in 2013. Ct Abdomen Pelvis W Iv Contrast Additional Contrast? None    Result Date: 10/14/2020  1. Findings consistent with interstitial edema with small pleural effusions and dependent atelectasis. 2.  Periportal edema, a nonspecific finding that may be related to hydration therapy versus underlying liver inflammation.  3.  Patchy peripheral areas of decreased

## 2020-10-18 VITALS
RESPIRATION RATE: 22 BRPM | HEART RATE: 86 BPM | OXYGEN SATURATION: 97 % | HEIGHT: 62 IN | TEMPERATURE: 98 F | WEIGHT: 107 LBS | BODY MASS INDEX: 19.69 KG/M2 | SYSTOLIC BLOOD PRESSURE: 147 MMHG | DIASTOLIC BLOOD PRESSURE: 65 MMHG

## 2020-10-18 LAB
ABSOLUTE EOS #: 0 K/UL (ref 0–0.4)
ABSOLUTE IMMATURE GRANULOCYTE: 0.15 K/UL (ref 0–0.3)
ABSOLUTE LYMPH #: 2 K/UL (ref 1–4.8)
ABSOLUTE MONO #: 0.15 K/UL (ref 0.1–0.8)
ANION GAP SERPL CALCULATED.3IONS-SCNC: 10 MMOL/L (ref 9–17)
BASOPHILS # BLD: 1 % (ref 0–2)
BASOPHILS ABSOLUTE: 0.08 K/UL (ref 0–0.2)
BLOOD BANK SPECIMEN: NORMAL
BUN BLDV-MCNC: 21 MG/DL (ref 8–23)
BUN/CREAT BLD: ABNORMAL (ref 9–20)
CALCIUM SERPL-MCNC: 8.5 MG/DL (ref 8.6–10.4)
CHLORIDE BLD-SCNC: 104 MMOL/L (ref 98–107)
CO2: 20 MMOL/L (ref 20–31)
CREAT SERPL-MCNC: 0.95 MG/DL (ref 0.5–0.9)
DIFFERENTIAL TYPE: ABNORMAL
EOSINOPHILS RELATIVE PERCENT: 0 % (ref 1–4)
GFR AFRICAN AMERICAN: >60 ML/MIN
GFR NON-AFRICAN AMERICAN: 56 ML/MIN
GFR SERPL CREATININE-BSD FRML MDRD: ABNORMAL ML/MIN/{1.73_M2}
GFR SERPL CREATININE-BSD FRML MDRD: ABNORMAL ML/MIN/{1.73_M2}
GLUCOSE BLD-MCNC: 120 MG/DL (ref 70–99)
HCT VFR BLD CALC: 30.3 % (ref 36.3–47.1)
HCT VFR BLD CALC: 32.1 % (ref 36.3–47.1)
HEMOGLOBIN: 8.5 G/DL (ref 11.9–15.1)
HEMOGLOBIN: 9.1 G/DL (ref 11.9–15.1)
IMMATURE GRANULOCYTES: 2 %
LYMPHOCYTES # BLD: 26 % (ref 24–44)
MCH RBC QN AUTO: 25.6 PG (ref 25.2–33.5)
MCHC RBC AUTO-ENTMCNC: 28.3 G/DL (ref 28.4–34.8)
MCV RBC AUTO: 90.4 FL (ref 82.6–102.9)
MONOCYTES # BLD: 2 % (ref 1–7)
MORPHOLOGY: ABNORMAL
NRBC AUTOMATED: 4.2 PER 100 WBC
NUCLEATED RED BLOOD CELLS: 5 PER 100 WBC
PDW BLD-RTO: 25.2 % (ref 11.8–14.4)
PLATELET # BLD: ABNORMAL K/UL (ref 138–453)
PLATELET ESTIMATE: ABNORMAL
PLATELET, FLUORESCENCE: 75 K/UL (ref 138–453)
PLATELET, IMMATURE FRACTION: 0.8 % (ref 1.1–10.3)
PMV BLD AUTO: ABNORMAL FL (ref 8.1–13.5)
POTASSIUM SERPL-SCNC: 4.7 MMOL/L (ref 3.7–5.3)
RBC # BLD: 3.55 M/UL (ref 3.95–5.11)
RBC # BLD: ABNORMAL 10*6/UL
SEG NEUTROPHILS: 69 % (ref 36–66)
SEGMENTED NEUTROPHILS ABSOLUTE COUNT: 5.32 K/UL (ref 1.8–7.7)
SODIUM BLD-SCNC: 134 MMOL/L (ref 135–144)
V617F MUTATION, QNT: 0 %
WBC # BLD: 7.7 K/UL (ref 3.5–11.3)
WBC # BLD: ABNORMAL 10*3/UL

## 2020-10-18 PROCEDURE — 2580000003 HC RX 258: Performed by: STUDENT IN AN ORGANIZED HEALTH CARE EDUCATION/TRAINING PROGRAM

## 2020-10-18 PROCEDURE — 6370000000 HC RX 637 (ALT 250 FOR IP): Performed by: INTERNAL MEDICINE

## 2020-10-18 PROCEDURE — 86900 BLOOD TYPING SEROLOGIC ABO: CPT

## 2020-10-18 PROCEDURE — 94761 N-INVAS EAR/PLS OXIMETRY MLT: CPT

## 2020-10-18 PROCEDURE — 6370000000 HC RX 637 (ALT 250 FOR IP): Performed by: STUDENT IN AN ORGANIZED HEALTH CARE EDUCATION/TRAINING PROGRAM

## 2020-10-18 PROCEDURE — 2580000003 HC RX 258: Performed by: INTERNAL MEDICINE

## 2020-10-18 PROCEDURE — 85055 RETICULATED PLATELET ASSAY: CPT

## 2020-10-18 PROCEDURE — 86920 COMPATIBILITY TEST SPIN: CPT

## 2020-10-18 PROCEDURE — 85025 COMPLETE CBC W/AUTO DIFF WBC: CPT

## 2020-10-18 PROCEDURE — 99239 HOSP IP/OBS DSCHRG MGMT >30: CPT | Performed by: INTERNAL MEDICINE

## 2020-10-18 PROCEDURE — 36430 TRANSFUSION BLD/BLD COMPNT: CPT

## 2020-10-18 PROCEDURE — 99232 SBSQ HOSP IP/OBS MODERATE 35: CPT | Performed by: INTERNAL MEDICINE

## 2020-10-18 PROCEDURE — P9016 RBC LEUKOCYTES REDUCED: HCPCS

## 2020-10-18 PROCEDURE — 36415 COLL VENOUS BLD VENIPUNCTURE: CPT

## 2020-10-18 PROCEDURE — 85018 HEMOGLOBIN: CPT

## 2020-10-18 PROCEDURE — 85014 HEMATOCRIT: CPT

## 2020-10-18 PROCEDURE — 80048 BASIC METABOLIC PNL TOTAL CA: CPT

## 2020-10-18 RX ADMIN — TRAMADOL HYDROCHLORIDE 50 MG: 50 TABLET, FILM COATED ORAL at 04:04

## 2020-10-18 RX ADMIN — METOPROLOL TARTRATE 25 MG: 25 TABLET ORAL at 08:27

## 2020-10-18 RX ADMIN — LEVOTHYROXINE SODIUM 75 MCG: 75 TABLET ORAL at 06:51

## 2020-10-18 RX ADMIN — DOCUSATE SODIUM 100 MG: 100 CAPSULE, LIQUID FILLED ORAL at 08:26

## 2020-10-18 RX ADMIN — SODIUM CHLORIDE, PRESERVATIVE FREE 10 ML: 5 INJECTION INTRAVENOUS at 08:55

## 2020-10-18 RX ADMIN — ATORVASTATIN CALCIUM 10 MG: 10 TABLET, FILM COATED ORAL at 08:26

## 2020-10-18 RX ADMIN — OXYCODONE HYDROCHLORIDE AND ACETAMINOPHEN 500 MG: 500 TABLET ORAL at 08:26

## 2020-10-18 RX ADMIN — CEPHALEXIN 250 MG: 250 CAPSULE ORAL at 00:11

## 2020-10-18 RX ADMIN — TRAMADOL HYDROCHLORIDE 50 MG: 50 TABLET, FILM COATED ORAL at 09:59

## 2020-10-18 RX ADMIN — PANTOPRAZOLE SODIUM 40 MG: 40 TABLET, DELAYED RELEASE ORAL at 06:51

## 2020-10-18 RX ADMIN — SODIUM CHLORIDE 20 ML: 0.9 INJECTION, SOLUTION INTRAVENOUS at 01:37

## 2020-10-18 RX ADMIN — Medication 2000 UNITS: at 08:26

## 2020-10-18 RX ADMIN — Medication 1 TABLET: at 08:26

## 2020-10-18 RX ADMIN — CEPHALEXIN 250 MG: 250 CAPSULE ORAL at 06:51

## 2020-10-18 RX ADMIN — APIXABAN 2.5 MG: 2.5 TABLET, FILM COATED ORAL at 08:26

## 2020-10-18 ASSESSMENT — PAIN SCALES - GENERAL
PAINLEVEL_OUTOF10: 5
PAINLEVEL_OUTOF10: 6
PAINLEVEL_OUTOF10: 3
PAINLEVEL_OUTOF10: 7

## 2020-10-18 NOTE — PLAN OF CARE
Problem: Falls - Risk of:  Goal: Will remain free from falls  Description: Will remain free from falls  10/18/2020 0951 by Cullen Candelario RN  Outcome: Ongoing     Problem: Falls - Risk of:  Goal: Absence of physical injury  Description: Absence of physical injury  10/18/2020 0951 by Cullen Candelario RN  Outcome: Ongoing     Problem: Venous Thromboembolism:  Goal: Will show no signs or symptoms of venous thromboembolism  Description: Will show no signs or symptoms of venous thromboembolism  10/18/2020 0951 by Cullen Candelario RN  Outcome: Ongoing     Problem: Venous Thromboembolism:  Goal: Absence of signs or symptoms of impaired coagulation  Description: Absence of signs or symptoms of impaired coagulation  10/18/2020 0951 by Cullen Candelario RN  Outcome: Ongoing     Problem: Pain:  Goal: Pain level will decrease  Description: Pain level will decrease  10/18/2020 0951 by Cullen Candelario RN  Outcome: Ongoing     Problem: Pain:  Goal: Control of acute pain  Description: Control of acute pain  10/18/2020 0951 by Cullen Candelario RN  Outcome: Ongoing     Problem: Pain:  Goal: Control of chronic pain  Description: Control of chronic pain  10/18/2020 0951 by Cullen Candelario RN  Outcome: Ongoing

## 2020-10-18 NOTE — CARE COORDINATION
Discharge order noted. Call to Sunni ([phone]) with Jade Newell at [facility], they are ready to accept patient any time. AVS with TEO to be faxed to [phone]. JESUS vega. RN to call report to [phone].

## 2020-10-18 NOTE — PLAN OF CARE
Problem: Falls - Risk of:  Goal: Will remain free from falls  Description: Will remain free from falls  Outcome: Met This Shift  Pt remained free of falls. Bed is locked, in lowest position, and bed alarm on. Side rails up x2. Non skid socks remain on. Call lgiht within reach and pt reminded of use. Calls out appropriately. Personal items within reach. Maintained nursing hourly rounds. Problem: Falls - Risk of:  Goal: Absence of physical injury  Description: Absence of physical injury  Outcome: Met This Shift     Problem: Venous Thromboembolism:  Goal: Will show no signs or symptoms of venous thromboembolism  Description: Will show no signs or symptoms of venous thromboembolism  Outcome: Met This Shift     Problem: Venous Thromboembolism:  Goal: Absence of signs or symptoms of impaired coagulation  Description: Absence of signs or symptoms of impaired coagulation  Outcome: Met This Shift     Problem: Pain:  Goal: Control of acute pain  Description: Control of acute pain  Outcome: Met This Shift     Problem: Pain:  Goal: Pain level will decrease  Description: Pain level will decrease  Outcome: Ongoing     Problem: Pain:  Goal: Control of chronic pain  Description: Control of chronic pain  Outcome: Ongoing   Pain assessed frequently throughout shift. Pt able to communicate pain verbally using 0-10 scale and descriptions. Pt educated on nonpharmacological interventions. PRN medications available. Pain reassessed after interventions.

## 2020-10-18 NOTE — PROGRESS NOTES
[facility]  Internal Medicine Teaching Residency Program  Inpatient Daily Progress Note  ______________________________________________________________________________    Patient: Nydia Pastrana  YOB: 1940   JUL:[id number]    Acct: [de-identified]     Room: 5/26-80  Admit date: 10/13/2020  Today's date: 10/18/20  Number of days in the hospital: 5    SUBJECTIVE   Admitting Diagnosis: Iron deficiency anemia  CC: Right leg pain   Pt examined at bedside. Chart & results reviewed. Hemodynamically stable    Bowel movements Good after enema  Leucocytosis wbc 11<15.2>14.2<7.7 , HB 6.9<7.3<7.4<7.6>6.9<9.1     ROS:  Constitutional:  negative for chills, fevers, sweats  Respiratory:  negative for cough, dyspnea on exertion, hemoptysis, shortness of breath, wheezing  Cardiovascular:  negative for chest pain, chest pressure/discomfort, lower extremity edema, palpitations  Gastrointestinal:  negative for abdominal pain, constipation, diarrhea, nausea, vomiting  Neurological:  negative for dizziness, headache  BRIEF HISTORY     The patient is a pleasant 78 y. o. female with past medical history of hyperthyroidism, HTN , Urinary incontinence, carotid artery stenosis, Tricuspid valve insufficiency, Mitral regurgitation, chronic back pain, osteoporosis,presents  to the ED with a chief complaint of right leg pain     To begin with pt had right sided leg pain over the last 7 days which is sudden in onset, dull, no aggravating and relieving factors. She denies trauma to legs. Pt also complains of some dizziness and lightheadedness and found to have anemia with hb of 5.6. pt states that she also have constipation and takes colace to relieve constipation. She had noticed some blood in stool in remote past and had dark red stools too.  She denies any acute blood recently in stool and no bleeding.     Review of Systems   Constitutional: Positive for appetite change, fatigue and unexpected weight change. Gastrointestinal: Positive for blood in stool and constipation. Negative .    Neurological: Positive for light-headedness.       Initial Vitals on presentation : 154/66, pr 94, rr 18, temp of 99.1     Significant Labs :        Hb of 5.6>5.5 and platelets of 984,695, ferritin 9, probnp 2268, d dimer of 0.30, Troponin of 26>16,      Imaging:   CXR- Interstitial and right basilar infiltrates and small effusion and suspicious small hiatal hernia   Duplex lower limbs - acute DVT of right leg involving gastrocnemius vein and superficial thrombophlebitis of right small sephanous vein in the proximal calf. EKG - sinus rhythm and 1st degree AV block and prolonged QTc of 492  Treatment in ED :  1 prbc transfusion     10/14/2020- pending heme onch and Gi rec will monitor pt for now  10/15/2020-CT angiography abdomen, patient colonoscopy, pending recommendations for anticoagulation. 10/16/2020 - Discharge today after ac for DVT prophylaxis. OBJECTIVE     Vital Signs:  BP (!) 141/64   Pulse 74   Temp 98.2 °F (36.8 °C) (Oral)   Resp 15   Ht 5' 2\" (1.575 m)   Wt 107 lb (48.5 kg)   LMP 1975 (Approximate)   SpO2 97%   BMI 19.57 kg/m²     Temp (24hrs), Av.4 °F (36.9 °C), Min:97.9 °F (36.6 °C), Max:98.9 °F (37.2 °C)    In: 885   Out: -     Physical Exam:  Constitutional: This is a well developed, well nourished, 18.5-24.9 - Normal 78y.o. year old female who is alert, oriented, cooperative and in no apparent distress. Head:normocephalic and atraumatic. EENT:  PERRLA. No conjunctival injections. Septum was midline, mucosa was without erythema, exudates or cobblestoning. No thrush was noted. Neck: Supple without thyromegaly. No elevated JVP. Trachea was midline. Respiratory: Chest was symmetrical without dullness to percussion. Breath sounds bilaterally were clear to auscultation. There were no wheezes, rhonchi or rales.  There is no intercostal retraction or use of accessory muscles. No egophony noted. Cardiovascular: Regular without murmur, clicks, gallops or rubs. Abdomen: Slightly rounded and soft without organomegaly. No rebound, rigidity or guarding was appreciated. Lymphatic: No lymphadenopathy. Musculoskeletal: Back pain noted. Normal curvature of the spine. No gross muscle weakness. Extremities:Pedal edema noted. No lower extremity edema, ulcerations, tenderness, varicosities or erythema. Muscle size, tone and strength are normal.  No involuntary movements are noted. Skin:  Warm and dry. Good color, turgor and pigmentation. No lesions or scars.   No cyanosis or clubbing  Neurological/Psychiatric: The patient's general behavior, level of consciousness, thought content and emotional status is normal.        Medications:  Scheduled Medications:    polyethylene glycol  17 g Oral Daily    cephALEXin  250 mg Oral 4 times per day    apixaban  2.5 mg Oral BID    sodium chloride (PF)  10 mL Intravenous BID    metoprolol tartrate  25 mg Oral BID    pantoprazole  40 mg Oral QAM AC    ascorbic acid  500 mg Oral Daily    calcium carbonate-vitamin D  1 tablet Oral Daily with breakfast    Vitamin D  2,000 Units Oral Daily    levothyroxine  75 mcg Oral Daily    atorvastatin  10 mg Oral Daily    sodium chloride flush  10 mL Intravenous 2 times per day    iron sucrose  200 mg Intravenous Daily    docusate sodium  100 mg Oral Daily     Continuous Infusions:   PRN Medicationspromethazine, 12.5 mg, Q6H PRN  traMADol, 50 mg, Q6H PRN  labetalol, 10 mg, Q6H PRN  sodium chloride flush, 10 mL, PRN  acetaminophen, 650 mg, Q6H PRN    Or  acetaminophen, 650 mg, Q6H PRN  potassium chloride, 40 mEq, PRN    Or  potassium alternative oral replacement, 40 mEq, PRN    Or  potassium chloride, 10 mEq, PRN  magnesium sulfate, 2 g, PRN        Diagnostic Labs:  CBC:   Recent Labs     10/16/20  0519  10/17/20  0723 10/17/20  1426 10/17/20  2233 10/18/20  0504   WBC 15.2*  -- 14.2*  --   --  7.7   RBC 3.15*  --  3.18*  --   --  3.55*   HGB 7.5*   < > 7.6* 7.5* 6.9* 9.1*   HCT 26.6*   < > 27.5* 26.6* 25.5* 32.1*   MCV 84.4  --  86.5  --   --  90.4   RDW 22.5*  --  24.0*  --   --  25.2*   *  --  811*  --   --  See Reflexed IPF Result    < > = values in this interval not displayed. BMP:   Recent Labs     10/16/20  0519 10/17/20  0723 10/18/20  0504    137 134*   K 4.2 4.4 4.7    102 104   CO2 21 21 20   BUN 19 20 21   CREATININE 0.85 0.86 0.95*     BNP: No results for input(s): BNP in the last 72 hours. PT/INR: No results for input(s): PROTIME, INR in the last 72 hours. APTT:   No results for input(s): APTT in the last 72 hours. CARDIAC ENZYMES: No results for input(s): CKMB, CKMBINDEX, TROPONINI in the last 72 hours. Invalid input(s): CKTOTAL;3  FASTING LIPID PANEL:  Lab Results   Component Value Date    CHOL 176 08/23/2019    HDL 92 08/23/2019    TRIG 48 08/23/2019     LIVER PROFILE:   No results for input(s): AST, ALT, ALB, BILIDIR, BILITOT, ALKPHOS in the last 72 hours. MICROBIOLOGY: No results found for: CULTURE    Imaging:    Xr Chest (single View Frontal)    Result Date: 10/13/2020  Mildly prominent interstitial change seen diffusely within the lungs which may represent mild pulmonary edema. There is also a right basilar infiltrate and small effusion which could represent pneumonia. Enlargement of the central silhouette may be related to cardiomegaly, pericardial effusion, as well as possible hiatal hernia. Patient had a small hiatal hernia noted on CT imaging in 2013. Ct Abdomen Pelvis W Iv Contrast Additional Contrast? None    Result Date: 10/14/2020  1. Findings consistent with interstitial edema with small pleural effusions and dependent atelectasis. 2.  Periportal edema, a nonspecific finding that may be related to hydration therapy versus underlying liver inflammation.  3.  Patchy peripheral areas of decreased enhancement in the kidneys, which can be seen with bilateral embolic disease with resulting developing infarcts or possibly pyelonephritis. 4.  Moderate size hiatal hernia. 5.  Nonspecific mild gallbladder wall thickening without stones or biliary dilatation. This is likely related to the underlying cause of periportal edema, as described above. 6.  Osteopenia. Multilevel compression fractures in the lower thoracic and lower lumbar spine, many of which appear new since 2013. Cta Abdomen Pelvis W Contrast    Result Date: 10/15/2020  Moderate severe atherosclerotic changes with moderate severe ostial narrowing as detailed above involving mesenteric and renal arteries. .  No complete occlusion identified. ASSESSMENT & PLAN     ASSESSMENT / PLAN:     Principal Problem:    Iron deficiency anemia  Active Problems:    Hypothyroidism    Hyperlipidemia    Thrombocytosis (HCC)    History of carotid endarterectomy    Constipation    Severe tricuspid regurgitation    Acute deep vein thrombosis (DVT) of right lower extremity (HCC)    Hiatal hernia    Osteopenia  Resolved Problems:    * No resolved hospital problems. *    1. DVT of right leg.   - elliquis 2.5mg for 30 days on discharge     2. Severe Anemia secondary to iron deficiency vs GI bleed. -GI consult EGD negative and op colonoscopy     3. H/o of carotid artery stenosis and hyperlipidemia. -CTA concerning for stenosis of celiac, renal artery  - vascular surgery recommends no surgical interventions at this point and signed off   - continue on atorvastatin 10mg, will resume aspirin.     4. Hypothyroidism.  -continue on synthroid 75mcg      5. Osteopenia. -Vitamin d 2000 units daily, vitamin C and calcium carbonate OD      6.Suspected pneumonia -ruled out     7. Chronic back pain. - tramadol 50mg oral     8. Suspected UTI. - cephalexin 250mg on discharge.     DVT ppx : on heparin. GI ppx: pantoprazole 40mg oral.     PT/OT: ongoing.   Discharge Planning / SW: Likely discharge today Nancy Baker MD  Internal Medicine Resident, PGY-1  [facility] [facility];  [place], [place]  10/18/2020, 7:19 AM

## 2020-10-18 NOTE — PROGRESS NOTES
Progress Note    Today's Date: 10/18/2020  Patient Name: Rene Fields  Date of admission: 10/13/2020 12:48 PM  Patient's age: 78 y.o., 1940  Admission Dx: Anemia [D64.9]    Reason for Consult: Thrombocytosis and anemia  Requesting Physician: Shaye Ghosh MD    CHIEF COMPLAINT: b/l leg pain and lightheadedness    History Obtained From:  Patient and chart review    Interval History   Patient seen and examined at bedside. Resting comfortably in no acute distress. Right upper arm pain overnight,   Vitals reviewed. Afebrile, hemodynamically stable. No active bleeding. Hemoglobin stable. EGD was negative for any bleeding. Will need outpatient colonoscopy. Remains on IV Venofer. Possible discharge today. HISTORY OF PRESENT ILLNESS:      The patient is a 78 y.o.  female with past medical history of carotid stenosis s/p left carotid endarterectomy colectomy (2017), essential hypertension, hypothyroidism, osteoporosis and superficial thrombophlebitis lower extremity (was on Coumadin several years ago, not on any anticoagulation currently). who is admitted to the hospital for B/l LE pain and lightheadedness. Pt also mentioned having loss of appetite and weight loss. She was found to have severe fe def anemia with Hb-5.6/MCV 77.6, Fe sat-6, ferritin-9. Platelet 939 . Per smear showed severe reactive thrombocytosis from fe def anemia. No schistocytes or cell abnormality mentioned  Her L/E DVT scan showed new  Right leg acute DVT of gastro-numerous. Pt was taken to OR for EGD in morning which was unremarkable for any acute bleed. Ct abd/pelvis showed no abn lesions. Past Medical History:   has a past medical history of Accident, Arthritis, Back pain, Compression fracture, Dizziness, Heart valve disease, Hx of blood clots, Hyperlipidemia, Hypertension, Osteoporosis, Sprain of lumbar region, Unspecified hypothyroidism, Wears dentures, and Wears glasses.     Past Surgical History:   has a past surgical history that includes Appendectomy (); Dilation and curettage of uterus (BEFORE ); Hysterectomy (); Carotid endarterectomy (Left, 2017); and Upper gastrointestinal endoscopy (N/A, 10/14/2020). Medications:    Reviewed in Epic     Allergies:  Sulfa antibiotics    Social History:   reports that she quit smoking about 33 years ago. She has a 16.50 pack-year smoking history. She has never used smokeless tobacco. She reports that she does not drink alcohol or use drugs. Family History: family history includes Diabetes in her father; Heart Disease in her father; High Blood Pressure in her mother; Stroke in her mother. REVIEW OF SYSTEMS:    Constitutional:loss of appetite and weight loss+  Respiratory: negative for cough , sputum, dyspnea, wheezing, hemoptysis, chest pain   Cardiovascular: negative for chest pain, dyspnea, palpitations, orthopnea, PND   Gastrointestinal: negative for nausea, vomiting, diarrhea, constipation, abdominal pain, Dysphagia, hematemesis and hematochezia   Genitourinary: negative for frequency, dysuria, nocturia, urinary incontinence, and hematuria   Integument: negative for rash, skin lesions, bruises.    Hematologic/Lymphatic: negative for easy bruising, bleeding, lymphadenopathy, or petechiae   Endocrine: negative for heat or cold intolerance,weight changes, change in bowel habits and hair loss   Musculoskeletal: b/l LE pain  Neurological: negative for headaches, dizziness, seizures, weakness, numbness    PHYSICAL EXAM:      BP (!) 147/65   Pulse 86   Temp 98 °F (36.7 °C) (Oral)   Resp 22   Ht 5' 2\" (1.575 m)   Wt 107 lb (48.5 kg)   LMP 1975 (Approximate)   SpO2 97%   BMI 19.57 kg/m²    Temp (24hrs), Av.3 °F (36.8 °C), Min:97.9 °F (36.6 °C), Max:98.9 °F (37.2 °C)    General appearance - well appearing, no in pain or distress    Mouth - mucous membranes moist, pharynx normal without lesions   Neck - supple, no significant adenopathy   Lymphatics - no palpable lymphadenopathy, no hepatosplenomegaly   Chest - clear to auscultation, no wheezes, rales or rhonchi, symmetric air entry   Heart - normal rate, regular rhythm, normal S1, S2, no murmurs  Abdomen - soft, nontender, nondistended, no masses or organomegaly   Neurological - alert, oriented, normal speech, no focal findings or movement disorder noted   Musculoskeletal - no joint tenderness, deformity or swelling   Extremities - peripheral pulses normal, no pedal edema, no clubbing or cyanosis   Skin - normal coloration and turgor, no rashes, no suspicious skin lesions noted ,    DATA:    Labs:   CBC:   Recent Labs     10/17/20  0723  10/18/20  0504 10/18/20  0913   WBC 14.2*  --  7.7  --    HGB 7.6*   < > 9.1* 8.5*   HCT 27.5*   < > 32.1* 30.3*   *  --  See Reflexed IPF Result  --     < > = values in this interval not displayed. BMP:   Recent Labs     10/17/20  0723 10/18/20  0504    134*   K 4.4 4.7   CO2 21 20   BUN 20 21   CREATININE 0.86 0.95*   LABGLOM >60 56*   GLUCOSE 101* 120*     PT/INR: No results for input(s): PROTIME, INR in the last 72 hours. IMAGING DATA:    Doppler LE scan   Summary          Acute/sub-acute deep vein thrombosis of the right leg involving the     gastrocnemius veins.     Superficial thrombophlebitis of the right small saphenous vein in the     proximal calf. CT abd pelvis:   1.  Findings consistent with interstitial edema with small pleural effusions    and dependent atelectasis.         2.  Periportal edema, a nonspecific finding that may be related to hydration    therapy versus underlying liver inflammation.         3.  Patchy peripheral areas of decreased enhancement in the kidneys, which    can be seen with bilateral embolic disease with resulting developing infarcts    or possibly pyelonephritis.         4.  Moderate size hiatal hernia.          Primary Problem  Iron deficiency anemia    Active Hospital Problems    Diagnosis Date Noted    Iron deficiency anemia [D50.9] 10/15/2020    Hiatal hernia [K44.9] 10/15/2020    Osteopenia [M85.80] 10/15/2020    Thrombocytosis ([facility] [address] [D47.3] 10/13/2020    History of carotid endarterectomy [Z98.890] 10/13/2020    Constipation [K59.00] 10/13/2020    Severe tricuspid regurgitation [I07.1] 10/13/2020    Acute deep vein thrombosis (DVT) of right lower extremity ([facility] [address] [I82.401] 10/13/2020    Hyperlipidemia [E78.5] 10/26/2015    Hypothyroidism [E03.9]          IMPRESSION:   1. Severe fe def anemia  2. Reactive thrombocytosis from fe def anemia probably  3. Acute DVT of Rt leg involving gastrocnemius veins    RECOMMENDATIONS:  1. Reviewed labs and imaging studies. 2. Hb-stable. No active bleeding on EGD. Will need outpatient colonoscopy per GI.  3.  Status post IV iron infusion. 4. Clinically stable. Hemodynamically stable. Discharge home today. 5. We will have follow-up as outpatient for possible need of further iron. [address]                          Cell: [phone]    This note is created with the assistance of a speech recognition program.  While intending to generate a document that actually reflects the content of the visit, the document can still have some errors including those of syntax and sound a like substitutions which may escape proof reading. It such instances, actual meaning can be extrapolated by contextual diversion.

## 2020-10-19 LAB
ABO/RH: NORMAL
ANTIBODY SCREEN: NEGATIVE
ARM BAND NUMBER: NORMAL
BLD PROD TYP BPU: NORMAL
CROSSMATCH RESULT: NORMAL
DISPENSE STATUS BLOOD BANK: NORMAL
EXPIRATION DATE: NORMAL
TRANSFUSION STATUS: NORMAL
UNIT DIVISION: 0
UNIT NUMBER: NORMAL

## 2020-10-21 ENCOUNTER — OFFICE VISIT (OUTPATIENT)
Dept: ONCOLOGY | Age: 80
End: 2020-10-21
Payer: MEDICARE

## 2020-10-21 VITALS
SYSTOLIC BLOOD PRESSURE: 164 MMHG | DIASTOLIC BLOOD PRESSURE: 88 MMHG | WEIGHT: 120 LBS | TEMPERATURE: 97.1 F | HEART RATE: 64 BPM | RESPIRATION RATE: 18 BRPM | HEIGHT: 62 IN | BODY MASS INDEX: 22.08 KG/M2

## 2020-10-21 PROCEDURE — G8420 CALC BMI NORM PARAMETERS: HCPCS | Performed by: INTERNAL MEDICINE

## 2020-10-21 PROCEDURE — 1090F PRES/ABSN URINE INCON ASSESS: CPT | Performed by: INTERNAL MEDICINE

## 2020-10-21 PROCEDURE — G8399 PT W/DXA RESULTS DOCUMENT: HCPCS | Performed by: INTERNAL MEDICINE

## 2020-10-21 PROCEDURE — 1123F ACP DISCUSS/DSCN MKR DOCD: CPT | Performed by: INTERNAL MEDICINE

## 2020-10-21 PROCEDURE — G8484 FLU IMMUNIZE NO ADMIN: HCPCS | Performed by: INTERNAL MEDICINE

## 2020-10-21 PROCEDURE — 1036F TOBACCO NON-USER: CPT | Performed by: INTERNAL MEDICINE

## 2020-10-21 PROCEDURE — 99211 OFF/OP EST MAY X REQ PHY/QHP: CPT | Performed by: INTERNAL MEDICINE

## 2020-10-21 PROCEDURE — G8428 CUR MEDS NOT DOCUMENT: HCPCS | Performed by: INTERNAL MEDICINE

## 2020-10-21 PROCEDURE — 99215 OFFICE O/P EST HI 40 MIN: CPT | Performed by: INTERNAL MEDICINE

## 2020-10-21 PROCEDURE — 4040F PNEUMOC VAC/ADMIN/RCVD: CPT | Performed by: INTERNAL MEDICINE

## 2020-10-21 PROCEDURE — 1111F DSCHRG MED/CURRENT MED MERGE: CPT | Performed by: INTERNAL MEDICINE

## 2020-10-21 NOTE — PROGRESS NOTES
DIAGNOSIS:   Iron deficiency anemia  thrombocytosis  Recurrent venous thromboembolism  CURRENT THERAPY:  ~IV iron in the hospital  Decision about anticoagulation, discharged on low-dose Eliquis 2.5 mg during her recent bleeding  BRIEF CASE HISTORY:   Edinson Spence is a very pleasant 78 y.o. female   with past medical history of carotid stenosis s/p left carotid endarterectomy colectomy (2017), essential hypertension, hypothyroidism, osteoporosis and superficial thrombophlebitis lower extremity (was on Coumadin several years ago, not on any anticoagulation currently). who is admitted to the hospital for B/l LE pain and lightheadedness. Pt also mentioned having loss of appetite and weight loss. She was found to have severe fe def anemia with Hb-5.6/MCV 77.6, Fe sat-6, ferritin-9. Platelet 358 . Per smear showed severe reactive thrombocytosis from fe def anemia. No schistocytes or cell abnormality mentioned  Her L/E DVT scan showed new  Right leg acute DVT of gastro-numerous. Pt was taken to OR for EGD  which was unremarkable for any acute bleed. Ct abd/pelvis showed no abn lesions. While in-house, the patient was found to have gastrocnemius DVT. After discussion, and because of recent severe GI bleeding, we decided to offer her low-dose Eliquis at 2.5 mg. Was discharged to follow as an  INTERIM HISTORY:  The patient comes in today for a follow up, he overall feels well. She is in a nursing home. Right leg is swollen. But she has superficial thrombophlebitis of the left arm related to an IV received in house. Energy overall is pretty good compared to hospitalization  PAST MEDICAL HISTORY: has a past medical history of Accident, Arthritis, Back pain, Compression fracture, Dizziness, Heart valve disease, Hx of blood clots, Hyperlipidemia, Hypertension, Osteoporosis, Sprain of lumbar region, Unspecified hypothyroidism, Wears dentures, and Wears glasses.     PAST SURGICAL HISTORY: has a past surgical history that includes Appendectomy (1958); Dilation and curettage of uterus (BEFORE 1975); Hysterectomy (1975); Carotid endarterectomy (Left, 9/25/2017); and Upper gastrointestinal endoscopy (N/A, 10/14/2020). CURRENT MEDICATIONS:  has a current medication list which includes the following prescription(s): apixaban, metoprolol tartrate, ferrous sulfate, cephalexin, polyethylene glycol, levothyroxine, simvastatin, vitamin d, calcium citrate-vitamin d, tramadol, ascorbic acid, therapeutic multivitamin-minerals, and aspirin. ALLERGIES:  is allergic to sulfa antibiotics. FAMILY HISTORY: Negative for any hematological or oncological conditions. SOCIAL HISTORY:  reports that she quit smoking about 33 years ago. She has a 16.50 pack-year smoking history. She has never used smokeless tobacco. She reports that she does not drink alcohol or use drugs. REVIEW OF SYSTEMS:  General: no fever or night sweats, tired, she is wheelchair-bound  ENT: No double or blurred vision, no tinnitus or hearing problem, no dysphagia or sore throat   Respiratory: No chest pain, no shortness of breath, no cough or hemoptysis. Cardiovascular: Denies chest pain, PND or orthopnea. No L E swelling or palpitations. Gastrointestinal:    No nausea or vomiting, abdominal pain, diarrhea or constipation. Genitourinary: Denies dysuria, hematuria, frequency, urgency or incontinence. Neurological: Denies headaches, decreased LOC, no sensory or motor focal deficits. Musculoskeletal:   Left arm and right leg swelling as discussed  Skin: Redness and swelling in the left arm as discussed  Psychiatric:  No anxiety, no depression. Endocrine: no diabetes or thyroid disease. Hematologic: no bleeding , no adenopathy. PHYSICAL EXAM: Shows a well appearing 78y.o.-year-old female who tired. She is wheelchair-bound. Appears to her stated age.   Chronic back pain vital Signs: Blood pressure (!) 164/88, pulse 64, temperature 97.1 °F (36.2 °C), temperature source Temporal, resp. rate 18, height 5' 2\" (1.575 m), weight 120 lb (54.4 kg), last menstrual period 09/22/1975. HEENT: Normocephalic and atraumatic. Pupils are equal, round, reactive to light and accommodation. Extraocular muscles are intact. Neck: Showed no JVD, no carotid bruit . Lungs: Clear to auscultation bilaterally. Heart: Regular without any murmur. Abdomen: Soft, nontender. No hepatosplenomegaly. Extremities: Lower extremities show no edema, clubbing, or cyanosis. Breasts: Examination not done today. Neuro exam: intact cranial nerves bilaterally no motor or sensory deficit, gait is normal. Lymphatic: no adenopathy appreciated in the supraclavicular, axillary, cervical or inguinal area    Review of radiological studies:     Review of laboratory data:   Lab Results   Component Value Date    WBC 7.7 10/18/2020    HGB 8.5 (L) 10/18/2020    HCT 30.3 (L) 10/18/2020    MCV 90.4 10/18/2020    PLT See Reflexed IPF Result 10/18/2020     Lab Results   Component Value Date    FERRITIN 9 (L) 10/13/2020       IMPRESSION:   1. Iron deficiency anemia  2. Deep venous thrombosis below-knee right leg  3. Superficial thrombophlebitis left arm  4.  Chronic back pain  Plan:   Continue with oral iron, status post IV iron infusion  Continue with low-dose Eliquis, she seems to be well-tolerated  Apply ice packs to the left arm  We will reevaluate her in about 6 to 8 weeks and check response to oral and IV iron  The patient will need GI work-up including colonoscopy as an outpatient

## 2020-10-23 NOTE — DISCHARGE SUMMARY
[address] [facility]     Department of Internal Medicine - Staff Internal Medicine Teaching Service    INPATIENT DISCHARGE SUMMARY      Patient Identification:  Kiesha Love is a 78 y.o. female. :  1940  MRN: [id number]     Acct: [de-identified]   PCP: Lisa Álvarez MD  Admit Date:  10/13/2020  Discharge date and time: 10/18/2020 12:00 PM   Attending Provider: No att. providers found                                     [address] Problem Lists:  Principal Problem:    Iron deficiency anemia  Active Problems:    Hypothyroidism    Hyperlipidemia    Thrombocytosis ([address]    History of carotid endarterectomy    Constipation    Severe tricuspid regurgitation    Acute deep vein thrombosis (DVT) of right lower extremity ([address]    Hiatal hernia    Osteopenia  Resolved Problems:    * No resolved hospital problems. *      HOSPITAL STAY     Brief Inpatient course:   Kiesha Love is a 78 y.o. female who was admitted for the management of Iron deficiency anemia, presented to the emergency department with past medical history of hyperthyroidism, HTN , Urinary incontinence, carotid artery stenosis, Tricuspid valve insufficiency, Mitral regurgitation, chronic back pain, osteoporosis,presents  to the ED with a chief complaint of right leg pain     To begin with pt had right sided leg pain over the last 7 days which is sudden in onset, dull, no aggravating and relieving factors. She denies trauma to legs. Pt also complains of some dizziness and lightheadedness and found to have anemia with hb of 5.6. pt states that she also have constipation and takes colace to relieve constipation. She had noticed some blood in stool in remote past and had dark red stools too. She denies any acute blood recently in stool and no bleeding. 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       Procedures/ Significant Interventions:    EGD    Consults:     Consults:     Final Specialist Recommendations/Findings:   IP CONSULT TO INTERNAL MEDICINE  IP CONSULT TO GI  IP CONSULT TO CASE MANAGEMENT  IP CONSULT TO HEM/ONC  IP CONSULT TO VASCULAR SURGERY      Any Hospital Acquired Infections: none    Discharge Functional Status:  stable    DISCHARGE PLAN     Disposition: [facility]    Patient Instructions:   Discharge Medication List as of 10/18/2020  9:09 AM      START taking these medications    Details   apixaban (ELIQUIS) 2.5 MG TABS tablet Take 1 tablet by mouth 2 times daily, Disp-60 tablet,R-1Normal      metoprolol tartrate (LOPRESSOR) 25 MG tablet Take 1 tablet by mouth 2 times daily, Disp-60 tablet,R-3Normal      ferrous sulfate (IRON 325) 325 (65 Fe) MG tablet Take 1 tablet by mouth 2 times daily, Disp-180 tablet,R-3Normal      cephALEXin (KEFLEX) 250 MG capsule Take 1 capsule by mouth every 6 hours for 5 days, Disp-20 capsule,R-0Normal      polyethylene glycol (GLYCOLAX) 17 g packet Take 17 g by mouth daily, Disp-527 g,R-1Normal         CONTINUE these medications which have NOT CHANGED    Details   levothyroxine (SYNTHROID) 75 MCG tablet TAKE ONE TABLET BY MOUTH DAILY, Disp-90 tablet,R-0Normal      simvastatin (ZOCOR) 40 MG tablet Take 1 tablet by mouth nightly, Disp-90 tablet,R-1Normal      Cholecalciferol (VITAMIN D) 2000 units CAPS capsule Take 2,000 Units by mouth dailyHistorical Med      Calcium Citrate-Vitamin D (CITRACAL MAXIMUM PO) Take 1 tablet by mouth dailyHistorical Med      traMADol (ULTRAM) 50 MG tablet Take 50 mg by mouth every 6 hours as needed for Pain. ascorbic acid (VITAMIN C) 500 MG tablet Take 500 mg by mouth daily. therapeutic multivitamin-minerals (THERAGRAN-M) tablet Take 1 tablet by mouth daily. aspirin 81 MG tablet Take 81 mg by mouth daily. Activity: activity as tolerated    Diet: regular diet    Follow-up:    Nadya Carrasquillo MD  [address] [place] [address].  Angelo Swansonod [address] [place] [address]  [phone]    Schedule an appointment as soon as possible for a visit  Please call to make a follow up appt with our GI specialist    [facility] Tiffanie Álvarez MD  [address]  [phone]    Schedule an appointment as soon as possible for a visit in 1 week  Post hospital follow-up to be iron deficiency anemia, thrombocytosis and distal leg DVT    Wilber Peterson MD  [address] [facility] [address]  [phone]    Schedule an appointment as soon as possible for a visit in 1 week  Post hospital follow-up-severe iron deficiency anemia, anticoagulation for distal leg DVT and thrombocytosis    [facility]  [address]  [address] [id number] and renal artery stenosis    CAMILA Schaffer at [address] [place] [address]  [phone]          Patient Instructions:    Please take your blood thinner Eliquis 2.5 mg twice daily for 1 month at least and follow-up with hematooncology to decide on duration of therapy.     CBC check on 10/18/2020 and then weekly for 3 weeks results to PCP and nursing home doctor    Follow-up with hematology Dr. Jahaira Fried with GI    Monitor for any bleeding    Please follow-up with primary care physician for chronic low back pain    Follow up labs: CBC  Follow up imaging: none    Note that over 30 minutes was spent in preparing discharge papers, discussing discharge with patient, medication review, etc.      Eriberto Daniel MD, MD  Internal Medicine Resident, [facility];  [place], [place]  10/22/2020, 8:21 PM

## 2020-10-29 ENCOUNTER — HOSPITAL ENCOUNTER (OUTPATIENT)
Age: 80
Discharge: HOME | End: 2020-10-29
Payer: COMMERCIAL

## 2020-10-29 ENCOUNTER — OFFICE VISIT (OUTPATIENT)
Dept: GASTROENTEROLOGY | Age: 80
End: 2020-10-29
Payer: MEDICARE

## 2020-10-29 ENCOUNTER — HOSPITAL ENCOUNTER (EMERGENCY)
Age: 80
Discharge: HOME | End: 2020-10-30
Attending: EMERGENCY MEDICINE
Payer: MEDICARE

## 2020-10-29 VITALS — DIASTOLIC BLOOD PRESSURE: 96 MMHG | BODY MASS INDEX: 21.4 KG/M2 | WEIGHT: 117 LBS | SYSTOLIC BLOOD PRESSURE: 199 MMHG

## 2020-10-29 LAB
ABSOLUTE EOS #: 0.1 K/UL (ref 0–0.4)
ABSOLUTE EOS #: 0.12 K/UL (ref 0–0.44)
ABSOLUTE IMMATURE GRANULOCYTE: 0 K/UL (ref 0–0.3)
ABSOLUTE IMMATURE GRANULOCYTE: 0.12 K/UL (ref 0–0.3)
ABSOLUTE LYMPH #: 1.74 K/UL (ref 1.1–3.7)
ABSOLUTE LYMPH #: 2.65 K/UL (ref 1–4.8)
ABSOLUTE MONO #: 0.51 K/UL (ref 0.1–0.8)
ABSOLUTE MONO #: 1.04 K/UL (ref 0.1–1.2)
ALBUMIN SERPL-MCNC: 3.8 G/DL (ref 3.5–5.2)
ALBUMIN/GLOBULIN RATIO: 1.4 (ref 1–2.5)
ALP BLD-CCNC: 94 U/L (ref 35–104)
ALT SERPL-CCNC: 17 U/L (ref 5–33)
ANION GAP SERPL CALCULATED.3IONS-SCNC: 10 MMOL/L (ref 9–17)
ANION GAP SERPL CALCULATED.3IONS-SCNC: 12 MMOL/L (ref 9–17)
AST SERPL-CCNC: 28 U/L
BASOPHILS # BLD: 1 % (ref 0–2)
BASOPHILS # BLD: 1 % (ref 0–2)
BASOPHILS ABSOLUTE: 0.1 K/UL (ref 0–0.2)
BASOPHILS ABSOLUTE: 0.12 K/UL (ref 0–0.2)
BILIRUB SERPL-MCNC: 0.26 MG/DL (ref 0.3–1.2)
BUN BLDV-MCNC: 18 MG/DL (ref 8–23)
BUN BLDV-MCNC: 19 MG/DL (ref 8–23)
BUN/CREAT BLD: 26 (ref 9–20)
BUN/CREAT BLD: NORMAL (ref 9–20)
CALCIUM SERPL-MCNC: 9 MG/DL (ref 8.6–10.4)
CALCIUM SERPL-MCNC: 9.3 MG/DL (ref 8.6–10.4)
CHLORIDE BLD-SCNC: 101 MMOL/L (ref 98–107)
CHLORIDE BLD-SCNC: 98 MMOL/L (ref 98–107)
CO2: 27 MMOL/L (ref 20–31)
CO2: 28 MMOL/L (ref 20–31)
CREAT SERPL-MCNC: 0.71 MG/DL (ref 0.5–0.9)
CREAT SERPL-MCNC: 0.72 MG/DL (ref 0.5–0.9)
DIFFERENTIAL TYPE: ABNORMAL
DIFFERENTIAL TYPE: ABNORMAL
EOSINOPHILS RELATIVE PERCENT: 1 % (ref 1–4)
EOSINOPHILS RELATIVE PERCENT: 1 % (ref 1–4)
GFR AFRICAN AMERICAN: >60 ML/MIN
GFR AFRICAN AMERICAN: >60 ML/MIN
GFR NON-AFRICAN AMERICAN: >60 ML/MIN
GFR NON-AFRICAN AMERICAN: >60 ML/MIN
GFR SERPL CREATININE-BSD FRML MDRD: ABNORMAL ML/MIN/{1.73_M2}
GFR SERPL CREATININE-BSD FRML MDRD: ABNORMAL ML/MIN/{1.73_M2}
GFR SERPL CREATININE-BSD FRML MDRD: NORMAL ML/MIN/{1.73_M2}
GFR SERPL CREATININE-BSD FRML MDRD: NORMAL ML/MIN/{1.73_M2}
GLUCOSE BLD-MCNC: 84 MG/DL (ref 70–99)
GLUCOSE BLD-MCNC: 96 MG/DL (ref 70–99)
HCT VFR BLD CALC: 40.1 % (ref 36.3–47.1)
HCT VFR BLD CALC: 40.3 % (ref 36.3–47.1)
HEMOGLOBIN: 11.3 G/DL (ref 11.9–15.1)
HEMOGLOBIN: 11.5 G/DL (ref 11.9–15.1)
IMMATURE GRANULOCYTES: 0 %
IMMATURE GRANULOCYTES: 1 %
IRON SATURATION: 21 % (ref 20–55)
IRON: 62 UG/DL (ref 37–145)
LIPASE: 24 U/L (ref 13–60)
LYMPHOCYTES # BLD: 15 % (ref 24–43)
LYMPHOCYTES # BLD: 26 % (ref 24–44)
MCH RBC QN AUTO: 27.3 PG (ref 25.2–33.5)
MCH RBC QN AUTO: 27.3 PG (ref 25.2–33.5)
MCHC RBC AUTO-ENTMCNC: 28 G/DL (ref 28.4–34.8)
MCHC RBC AUTO-ENTMCNC: 28.7 G/DL (ref 28.4–34.8)
MCV RBC AUTO: 95 FL (ref 82.6–102.9)
MCV RBC AUTO: 97.3 FL (ref 82.6–102.9)
MONOCYTES # BLD: 5 % (ref 1–7)
MONOCYTES # BLD: 9 % (ref 3–12)
MORPHOLOGY: ABNORMAL
NRBC AUTOMATED: 0 PER 100 WBC
NRBC AUTOMATED: 0.2 PER 100 WBC
PDW BLD-RTO: 31.5 % (ref 11.8–14.4)
PDW BLD-RTO: 31.8 % (ref 11.8–14.4)
PLATELET # BLD: 996 K/UL (ref 138–453)
PLATELET # BLD: ABNORMAL K/UL (ref 138–453)
PLATELET ESTIMATE: ABNORMAL
PLATELET ESTIMATE: ABNORMAL
PLATELET, FLUORESCENCE: 1004 K/UL (ref 138–453)
PLATELET, IMMATURE FRACTION: 4.4 % (ref 1.1–10.3)
PMV BLD AUTO: 10.2 FL (ref 8.1–13.5)
PMV BLD AUTO: ABNORMAL FL (ref 8.1–13.5)
POTASSIUM SERPL-SCNC: 3.9 MMOL/L (ref 3.7–5.3)
POTASSIUM SERPL-SCNC: 4.1 MMOL/L (ref 3.7–5.3)
RBC # BLD: 4.14 M/UL (ref 3.95–5.11)
RBC # BLD: 4.22 M/UL (ref 3.95–5.11)
RBC # BLD: ABNORMAL 10*6/UL
RBC # BLD: ABNORMAL 10*6/UL
SEG NEUTROPHILS: 67 % (ref 36–66)
SEG NEUTROPHILS: 73 % (ref 36–65)
SEGMENTED NEUTROPHILS ABSOLUTE COUNT: 6.84 K/UL (ref 1.8–7.7)
SEGMENTED NEUTROPHILS ABSOLUTE COUNT: 8.46 K/UL (ref 1.5–8.1)
SODIUM BLD-SCNC: 136 MMOL/L (ref 135–144)
SODIUM BLD-SCNC: 140 MMOL/L (ref 135–144)
TOTAL IRON BINDING CAPACITY: 300 UG/DL (ref 250–450)
TOTAL PROTEIN: 6.6 G/DL (ref 6.4–8.3)
TROPONIN INTERP: ABNORMAL
TROPONIN T: ABNORMAL NG/ML
TROPONIN, HIGH SENSITIVITY: 18 NG/L (ref 0–14)
UNSATURATED IRON BINDING CAPACITY: 238 UG/DL (ref 112–347)
WBC # BLD: 10.2 K/UL (ref 3.5–11.3)
WBC # BLD: 11.6 K/UL (ref 3.5–11.3)
WBC # BLD: ABNORMAL 10*3/UL
WBC # BLD: ABNORMAL 10*3/UL

## 2020-10-29 PROCEDURE — 6370000000 HC RX 637 (ALT 250 FOR IP): Performed by: EMERGENCY MEDICINE

## 2020-10-29 PROCEDURE — 80053 COMPREHEN METABOLIC PANEL: CPT

## 2020-10-29 PROCEDURE — 83550 IRON BINDING TEST: CPT

## 2020-10-29 PROCEDURE — 1123F ACP DISCUSS/DSCN MKR DOCD: CPT | Performed by: INTERNAL MEDICINE

## 2020-10-29 PROCEDURE — G8427 DOCREV CUR MEDS BY ELIG CLIN: HCPCS | Performed by: INTERNAL MEDICINE

## 2020-10-29 PROCEDURE — 36415 COLL VENOUS BLD VENIPUNCTURE: CPT

## 2020-10-29 PROCEDURE — 80048 BASIC METABOLIC PNL TOTAL CA: CPT

## 2020-10-29 PROCEDURE — 99283 EMERGENCY DEPT VISIT LOW MDM: CPT

## 2020-10-29 PROCEDURE — 83540 ASSAY OF IRON: CPT

## 2020-10-29 PROCEDURE — 1111F DSCHRG MED/CURRENT MED MERGE: CPT | Performed by: INTERNAL MEDICINE

## 2020-10-29 PROCEDURE — 4040F PNEUMOC VAC/ADMIN/RCVD: CPT | Performed by: INTERNAL MEDICINE

## 2020-10-29 PROCEDURE — G8399 PT W/DXA RESULTS DOCUMENT: HCPCS | Performed by: INTERNAL MEDICINE

## 2020-10-29 PROCEDURE — 83690 ASSAY OF LIPASE: CPT

## 2020-10-29 PROCEDURE — G8420 CALC BMI NORM PARAMETERS: HCPCS | Performed by: INTERNAL MEDICINE

## 2020-10-29 PROCEDURE — G8482 FLU IMMUNIZE ORDER/ADMIN: HCPCS | Performed by: INTERNAL MEDICINE

## 2020-10-29 PROCEDURE — 85025 COMPLETE CBC W/AUTO DIFF WBC: CPT

## 2020-10-29 PROCEDURE — 1090F PRES/ABSN URINE INCON ASSESS: CPT | Performed by: INTERNAL MEDICINE

## 2020-10-29 PROCEDURE — 84484 ASSAY OF TROPONIN QUANT: CPT

## 2020-10-29 PROCEDURE — 85055 RETICULATED PLATELET ASSAY: CPT

## 2020-10-29 PROCEDURE — 99213 OFFICE O/P EST LOW 20 MIN: CPT | Performed by: INTERNAL MEDICINE

## 2020-10-29 PROCEDURE — 1036F TOBACCO NON-USER: CPT | Performed by: INTERNAL MEDICINE

## 2020-10-29 RX ORDER — METOPROLOL TARTRATE 50 MG/1
25 TABLET, FILM COATED ORAL ONCE
Status: COMPLETED | OUTPATIENT
Start: 2020-10-29 | End: 2020-10-29

## 2020-10-29 RX ORDER — LOSARTAN POTASSIUM AND HYDROCHLOROTHIAZIDE 25; 100 MG/1; MG/1
1 TABLET ORAL DAILY
COMMUNITY
End: 2020-11-12 | Stop reason: SDUPTHER

## 2020-10-29 RX ORDER — LOSARTAN POTASSIUM 50 MG/1
100 TABLET ORAL ONCE
Status: COMPLETED | OUTPATIENT
Start: 2020-10-29 | End: 2020-10-29

## 2020-10-29 RX ADMIN — METOPROLOL TARTRATE 25 MG: 50 TABLET, FILM COATED ORAL at 22:57

## 2020-10-29 RX ADMIN — LOSARTAN POTASSIUM 100 MG: 50 TABLET, FILM COATED ORAL at 22:59

## 2020-10-29 ASSESSMENT — PAIN DESCRIPTION - PAIN TYPE: TYPE: CHRONIC PAIN

## 2020-10-29 ASSESSMENT — PAIN SCALES - GENERAL: PAINLEVEL_OUTOF10: 10

## 2020-10-29 ASSESSMENT — ENCOUNTER SYMPTOMS
RESPIRATORY NEGATIVE: 1
GASTROINTESTINAL NEGATIVE: 1
EYES NEGATIVE: 1
BACK PAIN: 1
ALLERGIC/IMMUNOLOGIC NEGATIVE: 1

## 2020-10-29 ASSESSMENT — PAIN DESCRIPTION - LOCATION: LOCATION: BACK

## 2020-10-29 NOTE — PROGRESS NOTES
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 retained stool rectosigmoid junction may represent fecal impaction versus constipation versus stercoral ulcer. Bladder is unremarkable. Uterus absent. Trace free fluid dependent pelvis. No free air. Bibasilar atelectasis and pleural effusions. Moderate size retrocardiac hiatal hernia with question reflux involving the mid esophagus. Heart is enlarged. Multilevel degenerate changes of the thoracic spine lumbar spine with multilevel compression fractures L5 L3 2, 1 T11 and T10. Chronic. Otherwise, moderate severe multilevel degenerate change. Moderate severe atherosclerotic changes with moderate severe ostial narrowing as detailed above involving mesenteric and renal arteries. .  No complete occlusion identified.      Vl Dup Upper Extremity Venous Right    Result Date: 10/14/2020    [facility]  Vascular Upper Extremities Veins Procedure   Patient Name KEYANA TIRADO Date of Study           10/14/2020                L   Date of Birth 1940  Gender                  Female   Age           78 year(s)  Race                       Room Number   0409        Height:                 62 inch, 157.48 cm   Corporate ID  [id number]    Weight:                 111 pounds, 50.3 kg  #   Patient Acct  [de-identified]   BSA:        1.49 m^2    BMI:       20.3 kg/m^2  #   MR #          [id number]     Sonographer             aPxton Watson RVT   Accession #   [id number]  Interpreting Physician  Tee Mayorga   Referring                 Referring Physician     Oralia Reinoso  Nurse  Practitioner  Procedure Type of Study:   Veins: Upper Extremities Veins, Venous Scan Upper Right. Indications for Study:Pain. Patient Status: In Patient. Conclusions   Summary   No evidence of superficial or deep venous thrombosis in the right upper  extremity. Signature   ----------------------------------------------------------------  Electronically signed by Paxton Watson RVT(Sonographer) on  10/14/2020 02:18 PM  ----------------------------------------------------------------   ----------------------------------------------------------------  Electronically signed by Tee Mayorga(Interpreting physician)  on 10/14/2020 06:53 PM  ----------------------------------------------------------------  Findings:   Right Impression:  Right internal jugular, subclavian, axillary, brachial, ulnar, radial,  cephalic and basilic veins are compressible with normal doppler  responses. Risk Factors History +-----------+----------+---------------------------------------------------+ ! Diagnosis  ! Date      ! Comments                                           ! +-----------+----------+---------------------------------------------------+ ! Other      !10/11/2017! Lt. carotid endarterectomy with bovine pericardial ! !           !          !patch angioplasty                                  ! +-----------+----------+---------------------------------------------------+ ! Previous   ! 10/13/2020! Noncompressible right deep calf and small saphenous! ! Scan       !          !veins                                              ! +-----------+----------+---------------------------------------------------+   - The patient's risk factor(s) include: dyslipidemia and arterial     hypertension. Velocities are measured in cm/s ; Diameters are measured in cm Right UE Vein Measurements 2D Measurements +------------------------------------+----------+---------------+----------+ ! Location                            ! Visualized! Compressibility! Thrombosis! +------------------------------------+----------+---------------+----------+ ! Prox IJV                            ! Yes       ! Yes            ! None      ! +------------------------------------+----------+---------------+----------+ ! Dist IJV                            ! Yes       ! Yes            ! None      ! +------------------------------------+----------+---------------+----------+ ! Prox SCV                            ! Yes       ! Yes            ! None      ! +------------------------------------+----------+---------------+----------+ ! Dist SCV                            ! Yes       ! Yes            ! None      ! +------------------------------------+----------+---------------+----------+ ! Prox Axillary                       ! Yes       ! Yes            ! None      ! +------------------------------------+----------+---------------+----------+ ! Dist Axillary                       ! Yes       ! Yes            ! None      ! +------------------------------------+----------+---------------+----------+ ! Prox Brachial                       !Yes       ! Yes            ! None      ! +------------------------------------+----------+---------------+----------+ ! Dist Brachial                       !Yes       ! Yes            ! None      ! +------------------------------------+----------+---------------+----------+ ! Prox Radial                         !Yes       ! Yes            ! None      ! +------------------------------------+----------+---------------+----------+ ! Dist Radial                         !Yes       ! Yes            ! None      ! +------------------------------------+----------+---------------+----------+ ! Prox Ulnar                          ! Yes       ! Yes            ! None      ! +------------------------------------+----------+---------------+----------+ ! Dist Ulnar                          ! Yes       ! Yes            ! None      ! +------------------------------------+----------+---------------+----------+ ! Basilic at UA                       ! Yes       ! Yes            ! None      ! +------------------------------------+----------+---------------+----------+ ! Basilic at AF                       ! Yes       ! Yes            ! None      ! +------------------------------------+----------+---------------+----------+ ! Basilic at [address]                       ! Yes       ! Yes            ! None      ! +------------------------------------+----------+---------------+----------+ ! Cephalic at UA                      ! Yes       ! Yes            ! None      ! +------------------------------------+----------+---------------+----------+ ! Cephalic at AF                      ! Yes       ! Yes            ! None      ! +------------------------------------+----------+---------------+----------+ ! Cephalic at [address]                      ! Yes       ! Yes            ! None      ! +------------------------------------+----------+---------------+----------+ Doppler Measurements +-------------------------+-----------------------+------------------------+ ! Location                 ! Signal                 !Reflux                  ! +-------------------------+-----------------------+------------------------+ ! IJV                      ! Phasic                 !                        ! +-------------------------+-----------------------+------------------------+ ! SCV                      ! Phasic                 !                        ! +-------------------------+-----------------------+------------------------+ ! Axillary                 ! Phasic                 !                        ! +-------------------------+-----------------------+------------------------+         IMPRESSION: Cameron Helton is a 78 y.o. female with a past history remarkable for history of hypothyroidism, osteoporosis, carotid endarterectomy, appendectomy, hysterectomy, recent inpatient hospital course for symptomatic anemia requiring PRBC transfusion identified to have severe thrombocytosis and lower extremity DVT status post diagnostic upper endoscopy which failed to reveal any obvious sources of the patient's anemia, patient was subsequently discharged with plan for outpatient colonoscopy, referred for evaluation of this procedure. Patient denies any overt signs of GI bleeding. We will need to assess whether or not the patient's hemoglobin improved with iron supplementation. Last hemoglobin was approximately 8.5 g/dL on disposition. Patient was seen by hematology for anemia and thrombocytosis which was believed to be secondary to possible obscure insidious GI blood loss. Peripheral smear was obtained during patient's inpatient course which revealed severe microcytic anemia with severe thrombocytosis possibly reactive secondary to iron deficiency anemia. During the course of this patient's clinic visit patient requested that a colonoscopy performed locally near [place] as it was too burdensome for the patient to travel to [facility] for the procedure. PLAN:    1) Severe iron deficiency anemia with possible obscure lower GI source-CT abdomen angiogram failed to reveal any obvious colonic sources of overt bleeding during the patient's inpatient course. Will obtain repeat CBC and basic labs.   Will obtain fecal occult x3, if positive, will need to consider colonoscopy sooner rather than later, favorably near [place] if there is a local gastroenterologist or surgeon able to perform diagnostic procedure. If not, the patient will need to be evaluated near the [place] reason for the procedure. Prior to the procedure patient will need clearance from urology to hold Eliquis for minimum of 3 days. 2) fluctuating platelet counts with severe thrombocytosis identified during inpatient course. Is unclear whether or not this is primary versus secondary, reactive versus inflammatory etc.  Will need further input from hematology. Follow-up repeat blood count today    3) we will need close follow-up in approximately 4 weeks following blood tests and stool tests     5) Hypertension--patient observed to have systolic of 890'W, denies any neurocardiac symptoms. Informed patient that this was concerning and this would require    Thank you for allowing me to participate in the care of Ms. Qasim Morales. For any further questions please do not hesitate to contact me. I have reviewed and agree with the ROS entered by the MA/LPN from today's encounter documented in a separate note. Guerda Ortiz MD, MPH   [facility]  Office #: [phone]    this note is created with the assistance of a speech recognition program.  While intending to generate a document that actually reflects the content of the visit, the document can still have some errors including those of syntax and sound a like substitutions which may escape proof reading. It such instances, actual meaning can be extrapolated by contextual diversion.

## 2020-10-30 VITALS
OXYGEN SATURATION: 97 % | TEMPERATURE: 96.9 F | DIASTOLIC BLOOD PRESSURE: 113 MMHG | HEART RATE: 67 BPM | RESPIRATION RATE: 18 BRPM | SYSTOLIC BLOOD PRESSURE: 172 MMHG

## 2020-10-30 PROCEDURE — 6370000000 HC RX 637 (ALT 250 FOR IP): Performed by: EMERGENCY MEDICINE

## 2020-10-30 RX ORDER — TRAMADOL HYDROCHLORIDE 50 MG/1
50 TABLET ORAL ONCE
Status: COMPLETED | OUTPATIENT
Start: 2020-10-30 | End: 2020-10-30

## 2020-10-30 RX ADMIN — TRAMADOL HYDROCHLORIDE 50 MG: 50 TABLET, FILM COATED ORAL at 00:26

## 2020-10-30 ASSESSMENT — ENCOUNTER SYMPTOMS
WHEEZING: 0
ABDOMINAL PAIN: 0
NAUSEA: 0
VOMITING: 0
SHORTNESS OF BREATH: 0
COLOR CHANGE: 0

## 2020-10-30 ASSESSMENT — PAIN SCALES - GENERAL: PAINLEVEL_OUTOF10: 10

## 2020-10-30 NOTE — PROGRESS NOTES
Called patient in response to abnormal lab value, platelet count extremely elevated with significant thrombocytosis, she is hypertensive, relatively asymptomatic. Recommend patient be seen in the ED as soon as possible for her hypertensive urgency and her thrombocytosis. Will likely require peripheral smear and hematology evaluation.

## 2020-10-30 NOTE — ED NOTES
Report given to Sloan Molina at the [facility].       Chayo Riggs RN  10/30/20 0942

## 2020-10-30 NOTE — ED PROVIDER NOTES
children: Not on file    Years of education: Not on file    Highest education level: Not on file   Occupational History    Not on file   Social Needs    Financial resource strain: Not on file    Food insecurity     Worry: Not on file     Inability: Not on file    Transportation needs     Medical: Not on file     Non-medical: Not on file   Tobacco Use    Smoking status: Former Smoker     Packs/day: 0.50     Years: 33.00     Pack years: 16.50     Last attempt to quit: 1987     Years since quittin.1    Smokeless tobacco: Never Used   Substance and Sexual Activity    Alcohol use: No    Drug use: No    Sexual activity: Not on file   Lifestyle    Physical activity     Days per week: Not on file     Minutes per session: Not on file    Stress: Not on file   Relationships    Social connections     Talks on phone: Not on file     Gets together: Not on file     Attends Confucianist service: Not on file     Active member of club or organization: Not on file     Attends meetings of clubs or organizations: Not on file     Relationship status: Not on file    Intimate partner violence     Fear of current or ex partner: Not on file     Emotionally abused: Not on file     Physically abused: Not on file     Forced sexual activity: Not on file   Other Topics Concern    Not on file   Social History Narrative    Not on file       Family History   Problem Relation Age of Onset    Stroke Mother     High Blood Pressure Mother     Diabetes Father     Heart Disease Father        Allergies:  Sulfa antibiotics    Home Medications:  Prior to Admission medications    Medication Sig Start Date End Date Taking?  Authorizing Provider   losartan-hydroCHLOROthiazide (HYZAAR) 100-25 MG per tablet Take 1 tablet by mouth daily   Yes Historical Provider, MD   apixaban (ELIQUIS) 2.5 MG TABS tablet Take 1 tablet by mouth 2 times daily 10/17/20  Yes Mira Cogan, MD   metoprolol tartrate (LOPRESSOR) 25 MG tablet Take 1 tablet by °C) (Temporal)   Resp 18   LMP 09/22/1975 (Approximate)   SpO2 97%     Physical Exam  Constitutional:       General: She is not in acute distress. Appearance: She is well-developed. HENT:      Head: Normocephalic and atraumatic. Cardiovascular:      Rate and Rhythm: Normal rate and regular rhythm. Heart sounds: Normal heart sounds. No murmur. No friction rub. No gallop. Pulmonary:      Effort: Pulmonary effort is normal. No respiratory distress. Breath sounds: Normal breath sounds. No wheezing or rales. Abdominal:      General: There is no distension. Palpations: Abdomen is soft. Tenderness: There is no abdominal tenderness. There is no guarding or rebound. Musculoskeletal:         General: No tenderness. Skin:     General: Skin is warm. Findings: No erythema. Neurological:      Mental Status: She is alert.          DIFFERENTIAL  DIAGNOSIS     PLAN (LABS / IMAGING / EKG):  Orders Placed This Encounter   Procedures    CBC auto differential    Comprehensive Metabolic Panel    Lipase    Troponin       MEDICATIONS ORDERED:  Orders Placed This Encounter   Medications    losartan (COZAAR) tablet 100 mg    metoprolol tartrate (LOPRESSOR) tablet 25 mg    traMADol (ULTRAM) tablet 50 mg       DDX: Essential hypertension versus elevated platelets    DIAGNOSTIC RESULTS / EMERGENCY DEPARTMENT COURSE / MDM   :  Results for orders placed or performed during the hospital encounter of 10/29/20   CBC auto differential   Result Value Ref Range    WBC 11.6 (H) 3.5 - 11.3 k/uL    RBC 4.22 3.95 - 5.11 m/uL    Hemoglobin 11.5 (L) 11.9 - 15.1 g/dL    Hematocrit 40.1 36.3 - 47.1 %    MCV 95.0 82.6 - 102.9 fL    MCH 27.3 25.2 - 33.5 pg    MCHC 28.7 28.4 - 34.8 g/dL    RDW 31.5 (H) 11.8 - 14.4 %    Platelets 063 (H) 827 - 453 k/uL    MPV 10.2 8.1 - 13.5 fL    NRBC Automated 0.2 (H) 0.0 per 100 WBC    Differential Type NOT REPORTED     WBC Morphology NOT REPORTED     RBC Morphology NOT REPORTED     Platelet Estimate NOT REPORTED     Seg Neutrophils 73 (H) 36 - 65 %    Lymphocytes 15 (L) 24 - 43 %    Monocytes 9 3 - 12 %    Eosinophils % 1 1 - 4 %    Immature Granulocytes 1 (H) 0 %    Basophils 1 0 - 2 %    Segs Absolute 8.46 (H) 1.50 - 8.10 k/uL    Absolute Lymph # 1.74 1.10 - 3.70 k/uL    Absolute Mono # 1.04 0.10 - 1.20 k/uL    Absolute Eos # 0.12 0.00 - 0.44 k/uL    Absolute Immature Granulocyte 0.12 0.00 - 0.30 k/uL    Basophils Absolute 0.12 0.0 - 0.2 k/uL    Morphology ANISOCYTOSIS PRESENT     Morphology HYPOCHROMASIA PRESENT     Morphology Platelet scan shows Increased Platelets    Comprehensive Metabolic Panel   Result Value Ref Range    Glucose 96 70 - 99 mg/dL    BUN 19 8 - 23 mg/dL    CREATININE 0.72 0.50 - 0.90 mg/dL    Bun/Cre Ratio 26 (H) 9 - 20    Calcium 9.0 8.6 - 10.4 mg/dL    Sodium 136 135 - 144 mmol/L    Potassium 3.9 3.7 - 5.3 mmol/L    Chloride 98 98 - 107 mmol/L    CO2 28 20 - 31 mmol/L    Anion Gap 10 9 - 17 mmol/L    Alkaline Phosphatase 94 35 - 104 U/L    ALT 17 5 - 33 U/L    AST 28 <32 U/L    Total Bilirubin 0.26 (L) 0.3 - 1.2 mg/dL    Total Protein 6.6 6.4 - 8.3 g/dL    Alb 3.8 3.5 - 5.2 g/dL    Albumin/Globulin Ratio 1.4 1.0 - 2.5    GFR Non-African American >60 >60 mL/min    GFR African American >60 >60 mL/min    GFR Comment          GFR Staging         Lipase   Result Value Ref Range    Lipase 24 13 - 60 U/L   Troponin   Result Value Ref Range    Troponin, High Sensitivity 18 (H) 0 - 14 ng/L    Troponin T NOT REPORTED <0.03 ng/mL    Troponin Interp NOT REPORTED        IMPRESSION: 69-year-old female sent here due to elevated platelets. Patient has no symptoms otherwise, however was incidentally found to have hypertension which is why I gave her her home meds, I did some basic blood work to make sure that there is no secondary damage from the elevated blood pressures.   Patient platelets were in fact elevated for a long period of time this is not the first time that

## 2020-11-04 ENCOUNTER — TELEPHONE (OUTPATIENT)
Dept: GASTROENTEROLOGY | Age: 80
End: 2020-11-04

## 2020-11-04 NOTE — TELEPHONE ENCOUNTER
Ankit Segovia from [facility], which is where the patient lives called questioning the fecal occult x3 test Dr. Juwan Back ordered. She asked if his nurse could call her back at her earliest convenience; she is there till 6 pm tonight.

## 2020-11-04 NOTE — TELEPHONE ENCOUNTER
Writer called Ankit Segovia back. Clarified the orders for the fecal occult test. Yanet stated she would fax the results to our office once completed.

## 2020-11-04 NOTE — TELEPHONE ENCOUNTER
called Philipp Severance back. She was away from the nurses station and unable to talk. Bertinr left direct line to my phone and requested she call back and leave a detailed message. I informed the staff member that I would try to return her call this afternoon.

## 2020-11-11 ENCOUNTER — CARE COORDINATION (OUTPATIENT)
Dept: CARE COORDINATION | Age: 80
End: 2020-11-11

## 2020-11-11 NOTE — CARE COORDINATION
JONELLE PRINCE E-MAIL:  Rona Dill, ANJALI    [phone]  Ayan Flores / [facility] Coordinator      I apologize, I was just notified today that this patient discharged home on 2020. I was unable to establish contact via phone with her today. Patient name:  Lucia Roca  Patient : 1940  Facility Discharging: [facility] [place] [address]  Discharge Date: 2020  Services at discharge: home health  Patient agreeable to calls: unknown  Brief background: Patient is an 78 y.o. female admitted to [facility] [place] [address] 10/18/20 s/p hospitalization with right sided leg pain, Dx: gastrocnemius vein DVT, anemia, and UTI.

## 2020-11-12 ENCOUNTER — CARE COORDINATION (OUTPATIENT)
Dept: CASE MANAGEMENT | Age: 80
End: 2020-11-12

## 2020-11-12 ENCOUNTER — HOSPITAL ENCOUNTER (OUTPATIENT)
Dept: CT IMAGING | Age: 80
Discharge: HOME | End: 2020-11-14
Payer: MEDICARE

## 2020-11-12 PROCEDURE — 72131 CT LUMBAR SPINE W/O DYE: CPT

## 2020-11-12 NOTE — CARE COORDINATION
[facility] Transitions Initial Follow Up Call    Call within 2 business days of discharge: Yes    Patient: Yoni Navarrete Patient : 1940   MRN: <[id number]>  Reason for Admission: DVT of R lower leg  Discharge Date: 10/30/20 RARS: Readmission Risk Score: 19      Last Discharge [facility]       Complaint Diagnosis Description Type Department Provider    10/29/20 Abnormal Lab High platelet count . .. ED (DISCHARGE) [facility] ED Pam Bowles MD        Acute Care Course: [facility] [place] [facility] 10/18/20 s/p hospitalization at [facility] with right sided leg pain, Dx: gastrocnemius vein DVT, anemia, and UTI. D/c from [facility]  and Md [facility]       Sig Hx:   DVT, Severe tricuspid regurg, chronic back, back pain, constipation, urinary incontinance    Conversation:  Left HIPPA compliant message regarding the nature of the call and a request for a return call with my contact information       Follow up plan:  Second call.  Monitor pain in Right leg, iron anemia, back pain    LILIANA Rodney, [phone]  Maria T Pink / [facility] Transition Nurse         Care Transitions 24 Hour Call    Care Transitions Interventions         Follow Up  Future Appointments   Date Time Provider Adilene Cortes   2020  6:15 PM [facility] CAT SCAN ROOM [facility]   2020  9:15 AM MD Corrina KelseypakAmin Dipakkumar A   2020  1:45 PM Wallace Andrade MD sv gr lks [facility]   2020 12:45 PM Fransisca Easley MD tiff onc spe [facility]     LILIANA Rodney, RN   [phone]  [facility] Transition Nurse  [phone]

## 2020-11-13 ENCOUNTER — CARE COORDINATION (OUTPATIENT)
Dept: CASE MANAGEMENT | Age: 80
End: 2020-11-13

## 2020-11-13 NOTE — CARE COORDINATION
[place] [address] Transitions Follow Up Call    2020    Patient: Nydia Pastrana  Patient : 1940   MRN: <[id number]>  Reason for Admission: DVT of R lower leg  Discharge Date: 10/30/20 RARS: Readmission Risk Score: 19    Acute Care Course: [facility] [place] [facility] 10/18/20 s/p hospitalization at [facility] with right sided leg pain, Dx: gastrocnemius vein DVT, anemia, and UTI. D/c from [facility]  and [facility]        Sig Hx:   DVT, Severe tricuspid regurg, chronic back, back pain, constipation, urinary incontinance     Conversation:  Left HIPPA compliant message regarding the nature of the call and a request for a return call with my contact information        Follow up plan:  None. Unable to reach with 2 attempts     LILIANA Robles, [phone]  [address] [place] [address] / [place] [address] Transition Nurse     Spoke with: LVM    Care Transitions Subsequent and Final Call    Subsequent and Final Calls  Care Transitions Interventions  Other Interventions:             Follow Up  Future Appointments   Date Time Provider Adilene Cortes   2020  9:15 AM MD Benny Armendarizkumar A   2020  1:45 PM Phuong Franks MD sv gr lks [facility]   2020 12:45 PM Brian Das MD tiff onc spe [facility]       LILIANA Robles, RN   [address] [place] [address] Transition Nurse  [phone]

## 2021-05-08 RX ORDER — SIMVASTATIN 40 MG
TABLET ORAL
Qty: 90 TABLET | Refills: 1 | Status: SHIPPED | OUTPATIENT
Start: 2021-05-08

## 2021-05-17 DIAGNOSIS — I10 ESSENTIAL HYPERTENSION: Primary | ICD-10-CM

## 2021-05-17 RX ORDER — LOSARTAN POTASSIUM AND HYDROCHLOROTHIAZIDE 25; 100 MG/1; MG/1
1 TABLET ORAL DAILY
Qty: 90 TABLET | Refills: 0 | Status: SHIPPED | OUTPATIENT
Start: 2021-05-17 | End: 2021-08-17

## 2021-08-17 DIAGNOSIS — I10 ESSENTIAL HYPERTENSION: ICD-10-CM

## 2021-08-17 RX ORDER — LOSARTAN POTASSIUM AND HYDROCHLOROTHIAZIDE 25; 100 MG/1; MG/1
TABLET ORAL
Qty: 90 TABLET | Refills: 0 | Status: SHIPPED | OUTPATIENT
Start: 2021-08-17

## 2022-04-22 RX ORDER — LEVOTHYROXINE SODIUM 0.07 MG/1
TABLET ORAL
Qty: 90 TABLET | Refills: 0 | OUTPATIENT
Start: 2022-04-22

## 2022-04-27 RX ORDER — LEVOTHYROXINE SODIUM 0.07 MG/1
TABLET ORAL
Qty: 90 TABLET | Refills: 0 | OUTPATIENT
Start: 2022-04-27

## 2023-03-31 NOTE — PLAN OF CARE
Patient's PA was denied for phentermine (ADIPEX-P) 37.5 MG tablet     Denied  PA Detail   CaseId:[id number];Status:Denied; Review Type:Prior Auth; Appeal Information: Attention:ATTN: [address] [id number]. EMLISSA COSTELLO,[id number] XXTTJ:[phone] WKI:[phone]; Important - Please read the below note on eAppeals: Please reference the denial letter for information on the rights for an appeal, rationale for the denial, and how to submit an appeal including if any information is needed to support the appeal. Note about urgent situations - Generally, an urgent situation is one which, in the opinion of the provider, the health of the patient may be in serious jeopardy or may experience pain that cannot be adequately controlled while waiting for a decision on the appeal.; Case ID: [id number]      Payer:  [facility] Payer    [phone]    Electronic appeal:  Not supported   View History       Patient's denial was scanned into chart. Problem: Falls - Risk of:  Goal: Will remain free from falls  Description: Will remain free from falls  10/14/2020 2024 by Jasmyn Garcia RN  Outcome: Ongoing  10/14/2020 1111 by Bebe Jain RN  Outcome: Ongoing  Goal: Absence of physical injury  Description: Absence of physical injury  10/14/2020 2024 by Jasmyn Garcia RN  Outcome: Ongoing  10/14/2020 1111 by Bebe Jain RN  Outcome: Ongoing     Problem: Venous Thromboembolism:  Goal: Will show no signs or symptoms of venous thromboembolism  Description: Will show no signs or symptoms of venous thromboembolism  10/14/2020 2024 by Jasmyn Garcia RN  Outcome: Ongoing  10/14/2020 1111 by Bebe Jain RN  Outcome: Ongoing  Goal: Absence of signs or symptoms of impaired coagulation  Description: Absence of signs or symptoms of impaired coagulation  10/14/2020 2024 by Jasmyn Garcia RN  Outcome: Ongoing  10/14/2020 1111 by Bebe Jain RN  Outcome: Ongoing     Problem: Pain:  Goal: Pain level will decrease  Description: Pain level will decrease  10/14/2020 2024 by Jasmyn Garcia RN  Outcome: Ongoing  10/14/2020 1111 by Bebe Jain RN  Outcome: Ongoing  Goal: Control of acute pain  Description: Control of acute pain  10/14/2020 2024 by Jasmyn Garcia RN  Outcome: Ongoing  10/14/2020 1111 by Bebe Jain RN  Outcome: Ongoing  Goal: Control of chronic pain  Description: Control of chronic pain  10/14/2020 2024 by Jasmyn Garcia RN  Outcome: Ongoing  10/14/2020 1111 by Bebe Jain RN  Outcome: Ongoing

## (undated) DEVICE — SYRINGE, LUER LOCK, 10ML: Brand: MEDLINE

## (undated) DEVICE — DONUT HEADREST - 7" DIAMETER X 2" HEIGHT: Brand: SOULE MEDICAL

## (undated) DEVICE — GLOVE SURG SZ 75 L12IN FNGR THK87MIL WHT LTX FREE

## (undated) DEVICE — PAD N ADH W3XL4IN POLY COT SFT PERF FLM EASILY CUT ABSRB

## (undated) DEVICE — SUTURE NONABSORBABLE MONOFILAMENT 7-0 BV-1 1X24 IN PROLENE 8702H

## (undated) DEVICE — STRIP,CLOSURE,WOUND,MEDI-STRIP,1/2X4: Brand: MEDLINE

## (undated) DEVICE — Device

## (undated) DEVICE — GLOVE ORANGE PI 8   MSG9080

## (undated) DEVICE — PROTECTOR ULN NRV PUR FOAM HK LOOP STRP ANATOMICALLY

## (undated) DEVICE — SUTURE NONABSORBABLE MONOFILAMENT 6-0 C-1 1X30 IN PROLENE 8706H

## (undated) DEVICE — Z DISCONTINUED USE 2275676 GLOVE SURG SZ 65 L12IN FNGR THK87MIL DK GRN LTX FREE ISOLEX

## (undated) DEVICE — GLOVE SURG SZ 65 L12IN FNGR THK87MIL WHT LTX FREE

## (undated) DEVICE — SUTURE VCRL + SZ 3-0 L27IN ABSRB WHT CT-1 1/2 CIR VCP258H

## (undated) DEVICE — GLOVE SURG SZ 7 L12IN FNGR THK87MIL WHT LTX FREE

## (undated) DEVICE — GOWN,AURORA,NONREINFORCED,LARGE: Brand: MEDLINE

## (undated) DEVICE — SUTURE PERMAHAND SZ 4-0 L18IN NONABSORBABLE BLK SILK BRAID A183H

## (undated) DEVICE — AGENT HEMSTAT W2XL4IN OXIDIZED REGENERATED CELOS ABSRB

## (undated) DEVICE — HEADREST NEURO DONUT 7 IN

## (undated) DEVICE — FORCEPS BX L240CM WRK CHN 2.8MM STD CAP W/ NDL MIC MESH

## (undated) DEVICE — SUTURE MCRYL + SZ 4 0 L18IN ABSRB UD PC 3 L16MM 3 8 CIR PRIM MCP845G

## (undated) DEVICE — DRAPE,UTILITY,XL,4/PK,STERILE: Brand: MEDLINE

## (undated) DEVICE — SYRINGE 10CC LUER LOCK: Brand: CARDINAL HEALTH

## (undated) DEVICE — SPONGE: SPECIALTY PEANUT XR 100/CS: Brand: MEDICAL ACTION INDUSTRIES

## (undated) DEVICE — DISCONTINUED USE 112613 SWABSTICK ADHESIVE  BENZOIN TINCTURE LF